# Patient Record
(demographics unavailable — no encounter records)

---

## 2024-12-10 NOTE — PAST MEDICAL HISTORY
[Postmenopausal] : The patient is postmenopausal [unknown] : the patient is unsure of the date of her LMP [Total Preg ___] : G[unfilled] [History of Hormone Replacement Treatment] : has no history of hormone replacement treatment [Living ___] : Living: [unfilled] [FreeTextEntry7] : no [FreeTextEntry8] : no

## 2024-12-10 NOTE — CONSULT LETTER
[Dear  ___] : Dear ~GENA, [Consult Letter:] : I had the pleasure of evaluating your patient, [unfilled]. [Please see my note below.] : Please see my note below. [Consult Closing:] : Thank you very much for allowing me to participate in the care of this patient.  If you have any questions, please do not hesitate to contact me. [FreeTextEntry3] : Kelly Mario MD

## 2024-12-10 NOTE — ASSESSMENT
[FreeTextEntry1] : Referred by Dr. Tran Perez PCP NATHANIEL Aguiar   Patient is a 79 year old female here today for consultation. RIGHT DCIS* Breast calcs were initially found on CT to the left breast but further imaging found them to be benign, however indeterminate calcs were found on the RIght on breast imaging.  Pt denies any breast lesions, discharge or masses.  10/14/2024 SB B[facility]U bilat dMMG and US: Scattered areas of FG density. LEFT- UO breast reveals 2 coarse, benign calcifications. No susp calcs, architectural distortion or mass is seen. RIGHT*-Of incidental note, at the 12:00 right mid breast is focal architectural distortion w/associated heterogenous grouped microcalcs, persistent on additional targeted mmg imaging. This incidental finding is susp for malignancy, with no definitive US correlate.   US: LEFT: targeted imaging of the UO breast reveals coarse calcs which correlates to mmg findings. At the UO breast, there is a superficial focus of generalized increased density within the associated 2 mm debris-filled cyst, likely of recent traumatic injury. Coarse calcs 1:00 2cm FN correlates to benign mmg findings. RIGHT*: targeted imaging of the upper right breast reveals multiple debris-filled cysts, 12:00 1cm FN. A vague focus of decreased attenuation w/focal shadowing at the 12:00 position, though sonographically nonspecific. No definitive mass or distortion is seen to correlate to mmg findings above. A minimally dilated duct is noted at the right breast 9:00 1cm FN, minimally dilated ducts or noted at the RA region. Morphologically normal-appearing axillary LN bilat. Imp- Benign calcs at the UO LEFT breast correlates to outside CT. Incidental note of focal architectural distortion w/associated grouped heterogeneous calcs at the 12:00 RIGH* mid breast on mmg, susp for malignancy, as this finding has no definitive sono correlate. Rec- stereotactic bx RIGHT. BR4  11/21/2024 [facility] stereotactic core bx Pathology: UO RIGHT- DCIS, intermediate gr, nuclear atypia. Microcals present.  Imp-Results are CONDORDANT. Of note, residual calcs are seen at the bx site measuring up to 2.3cm in widest AP dimension. Wide excision should be considered at the time of surgery. Also MRI-enhanced breast MRI should be obtained to r/o additional areas of disease.  Rec-Surgical or Oncologic management.  RECEPTORS PENDING**  Fhx: Breast-Niece ? age. 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   CBE: Bilat pendulous breasts, right 12:00 bx site, no suspicious masses or lesions. No axillary or SC lymphadenopathy.  Long discussion with patient regarding the biopsy results from [facility] from 11/21/2024 showing DCIS of the R breast at the 12:00 oclock, 2-3 cm FTN, Grade 2 ER/ID pending, measuring 2.3 cm per span of calcium.   Reviewed extensively the options of mastectomy vs lumpectomy with the pros and cons for both. With mastectomy, option of reconstruction (expander/implant vs autologous flap) discussed and handout on reconstruction and contact for plastic surgeon given.  Will probably need a drain with mastectomy.  Also discussed usually no radiation with mastectomy but pending final pathology results.  Discussed usually no reexcision with mastectomy.  With lumpectomy, may need radiation and treatment was reviewed.  Jerome need wire or localizer localization.  FAMILIA  is a candidate for oncoplastic surgery and discussed with her in detail but she declines. Also discussed will need negative margins and if too close or positive, may need additional surgery reexcision vs mastectomy. Will not need SLNBx with lumpectomy but if microinvasion or invasion found on final path, may need delayed SLNbx or axillary surgery. Receptors pending to determine hormonal therapy.  No chemotherapy for Stage 0, DCIS. Medicare does not cover for DCIS despite Niece with breast cancer in her 30's.  FAMILIA  given contact information for radiation oncology, Dr. Alvarado.  She is leaning towards lumpectomy, reviewed 12:00 location, cosmesis may not be optimal.  Pt requests I speak to her Helen kim [phone].  PLAN: MRI breast Appt with Dr. Alvarado To determine if she would like to pay out of pocket for the genetic testing since no coverage. Pt leaning towards lumpectomy only. Bracketed wire given 2.3 cm span? Will determine after MRI.

## 2024-12-10 NOTE — PHYSICAL EXAM
[Normocephalic] : normocephalic [Atraumatic] : atraumatic [Supple] : supple [No Supraclavicular Adenopathy] : no supraclavicular adenopathy [Examined in the supine and seated position] : examined in the supine and seated position [Asymmetrical] : asymmetrical [Bra Size: ___] : Bra Size: [unfilled] [Grade 3] : Ptosis Grade 3 [No dominant masses] : no dominant masses in right breast  [No dominant masses] : no dominant masses left breast [No Nipple Retraction] : no left nipple retraction [No Nipple Discharge] : no left nipple discharge [No Axillary Lymphadenopathy] : no left axillary lymphadenopathy [No Edema] : no edema [No Rashes] : no rashes [No Ulceration] : no ulceration [de-identified] : Small 12:00 biopsy site noted. NO suspicious masses or lesions

## 2024-12-10 NOTE — HISTORY OF PRESENT ILLNESS
[FreeTextEntry1] : Referred by Dr. Tran Perez PCP NATHANIEL Aguiar   Patient is a 79 year old female here today for consultation. RIGHT DCIS* Breast calcs were initially found on CT to the left breast but further imaging found them to be benign, however indeterminate calcs were found on the RIght on breast imaging.  Pt denies any breast lesions, discharge or masses.  10/14/2024 SB B[facility]U bilat dMMG and US: Scattered areas of FG density. LEFT- UO breast reveals 2 coarse, benign calcifications. No susp calcs, architectural distortion or mass is seen. RIGHT*-Of incidental note, at the 12:00 right mid breast is focal architectural distortion w/associated heterogenous grouped microcalcs, persistent on additional targeted mmg imaging. This incidental finding is susp for malignancy, with no definitive US correlate.   US: LEFT: targeted imaging of the UO breast reveals coarse calcs which correlates to mmg findings. At the UO breast, there is a superficial focus of generalized increased density within the associated 2 mm debris-filled cyst, likely of recent traumatic injury. Coarse calcs 1:00 2cm FN correlates to benign mmg findings. RIGHT*: targeted imaging of the upper right breast reveals multiple debris-filled cysts, 12:00 1cm FN. A vague focus of decreased attenuation w/focal shadowing at the 12:00 position, though sonographically nonspecific. No definitive mass or distortion is seen to correlate to mmg findings above. A minimally dilated duct is noted at the right breast 9:00 1cm FN, minimally dilated ducts or noted at the RA region. Morphologically normal-appearing axillary LN bilat. Imp- Benign calcs at the UO LEFT breast correlates to outside CT. Incidental note of focal architectural distortion w/associated grouped heterogeneous calcs at the 12:00 RIGH* mid breast on mmg, susp for malignancy, as this finding has no definitive sono correlate. Rec- stereotactic bx RIGHT. BR4  11/21/2024 [facility] stereotactic core bx Pathology: UO RIGHT- DCIS, intermediate gr, nuclear atypia. Microcals present.  Imp-Results are CONDORDANT. Of note, residual calcs are seen at the bx site measuring up to 2.3cm in widest AP dimension. Wide excision should be considered at the time of surgery. Also MRI-enhanced breast MRI should be obtained to r/o additional areas of disease.  Rec-Surgical or Oncologic management.  RECEPTORS PENDING**  Fhx: Breast-Niece ? age. 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

## 2024-12-10 NOTE — DATA REVIEWED
[FreeTextEntry1] : 10/14/2024 SB BINU bilat dMMG and US: Scattered areas of FG density. LEFT- UO breast reveals 2 coarse, benign calcifications. No susp calcs, architectural distortion or mass is seen. RIGHT*-Of incidental note, at the 12:00 right mid breast is focal architectural distortion w/associated heterogenous grouped microcalcs, persistent on additional targeted mmg imaging. This incidental finding is susp for malignancy, with no definitive US correlate.   US: LEFT: targeted imaging of the UO breast reveals coarse calcs which correlates to mmg findings. At the UO breast, there is a superficial focus of generalized increased density within the associated 2 mm debris-filled cyst, likely of recent traumatic injury. Coarse calcs 1:00 2cm FN correlates to benign mmg findings. RIGHT*: targeted imaging of the upper right breast reveals multiple debris-filled cysts, 12:00 1cm FN. A vague focus of decreased attenuation w/focal shadowing at the 12:00 position, though sonographically nonspecific. No definitive mass or distortion is seen to correlate to mmg findings above. A minimally dilated duct is noted at the right breast 9:00 1cm FN, minimally dilated ducts or noted at the RA region. Morphologically normal-appearing axillary LN bilat. Imp- Benign calcs at the UO LEFT breast correlates to outside CT. Incidental note of focal architectural distortion w/associated grouped heterogeneous calcs at the 12:00 RIGH* mid breast on mmg, susp for malignancy, as this finding has no definitive sono correlate. Rec- stereotactic bx RIGHT. BR4  11/21/2024 [facility] RIGHT stereotactic core bx Pathology: UO RIGHT- DCIS, intermediate gr, nuclear atypia. Microcals present.  Imp-Results are CONDORDANT. Of note, residual calcs are seen at the bx site measuring up to 2.3cm in widest AP dimension. Wide excision should be considered at the time of surgery. Also MRI-enhanced breast MRI should be obtained to r/o additional areas of disease.  Rec-Surgical or Oncologic management.

## 2024-12-10 NOTE — PHYSICAL EXAM
[Normocephalic] : normocephalic [Atraumatic] : atraumatic [Supple] : supple [No Supraclavicular Adenopathy] : no supraclavicular adenopathy [Examined in the supine and seated position] : examined in the supine and seated position [Asymmetrical] : asymmetrical [Bra Size: ___] : Bra Size: [unfilled] [Grade 3] : Ptosis Grade 3 [No dominant masses] : no dominant masses in right breast  [No dominant masses] : no dominant masses left breast [No Nipple Retraction] : no left nipple retraction [No Nipple Discharge] : no left nipple discharge [No Axillary Lymphadenopathy] : no left axillary lymphadenopathy [No Edema] : no edema [No Rashes] : no rashes [No Ulceration] : no ulceration [de-identified] : Small 12:00 biopsy site noted. NO suspicious masses or lesions

## 2024-12-10 NOTE — HISTORY OF PRESENT ILLNESS
[FreeTextEntry1] : Referred by Dr. Tran Perez PCP NATHANIEL Aguiar   Patient is a 79 year old female here today for consultation. RIGHT DCIS* Breast calcs were initially found on CT to the left breast but further imaging found them to be benign, however indeterminate calcs were found on the RIght on breast imaging.  Pt denies any breast lesions, discharge or masses.  10/14/2024 SB BINU bilat dMMG and US: Scattered areas of FG density. LEFT- UO breast reveals 2 coarse, benign calcifications. No susp calcs, architectural distortion or mass is seen. RIGHT*-Of incidental note, at the 12:00 right mid breast is focal architectural distortion w/associated heterogenous grouped microcalcs, persistent on additional targeted mmg imaging. This incidental finding is susp for malignancy, with no definitive US correlate.   US: LEFT: targeted imaging of the UO breast reveals coarse calcs which correlates to mmg findings. At the UO breast, there is a superficial focus of generalized increased density within the associated 2 mm debris-filled cyst, likely of recent traumatic injury. Coarse calcs 1:00 2cm FN correlates to benign mmg findings. RIGHT*: targeted imaging of the upper right breast reveals multiple debris-filled cysts, 12:00 1cm FN. A vague focus of decreased attenuation w/focal shadowing at the 12:00 position, though sonographically nonspecific. No definitive mass or distortion is seen to correlate to mmg findings above. A minimally dilated duct is noted at the right breast 9:00 1cm FN, minimally dilated ducts or noted at the RA region. Morphologically normal-appearing axillary LN bilat. Imp- Benign calcs at the UO LEFT breast correlates to outside CT. Incidental note of focal architectural distortion w/associated grouped heterogeneous calcs at the 12:00 RIGH* mid breast on mmg, susp for malignancy, as this finding has no definitive sono correlate. Rec- stereotactic bx RIGHT. BR4  11/21/2024 [facility] stereotactic core bx Pathology: UO RIGHT- DCIS, intermediate gr, nuclear atypia. Microcals present.  Imp-Results are CONDORDANT. Of note, residual calcs are seen at the bx site measuring up to 2.3cm in widest AP dimension. Wide excision should be considered at the time of surgery. Also MRI-enhanced breast MRI should be obtained to r/o additional areas of disease.  Rec-Surgical or Oncologic management.  RECEPTORS PENDING**  Fhx: Breast-Niece ? age. 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

## 2024-12-10 NOTE — ASSESSMENT
[FreeTextEntry1] : Referred by Dr. Tran Perez PCP NATHANIEL Aguiar   Patient is a 79 year old female here today for consultation. RIGHT DCIS* Breast calcs were initially found on CT to the left breast but further imaging found them to be benign, however indeterminate calcs were found on the RIght on breast imaging.  Pt denies any breast lesions, discharge or masses.  10/14/2024 SB BINU bilat dMMG and US: Scattered areas of FG density. LEFT- UO breast reveals 2 coarse, benign calcifications. No susp calcs, architectural distortion or mass is seen. RIGHT*-Of incidental note, at the 12:00 right mid breast is focal architectural distortion w/associated heterogenous grouped microcalcs, persistent on additional targeted mmg imaging. This incidental finding is susp for malignancy, with no definitive US correlate.   US: LEFT: targeted imaging of the UO breast reveals coarse calcs which correlates to mmg findings. At the UO breast, there is a superficial focus of generalized increased density within the associated 2 mm debris-filled cyst, likely of recent traumatic injury. Coarse calcs 1:00 2cm FN correlates to benign mmg findings. RIGHT*: targeted imaging of the upper right breast reveals multiple debris-filled cysts, 12:00 1cm FN. A vague focus of decreased attenuation w/focal shadowing at the 12:00 position, though sonographically nonspecific. No definitive mass or distortion is seen to correlate to mmg findings above. A minimally dilated duct is noted at the right breast 9:00 1cm FN, minimally dilated ducts or noted at the RA region. Morphologically normal-appearing axillary LN bilat. Imp- Benign calcs at the UO LEFT breast correlates to outside CT. Incidental note of focal architectural distortion w/associated grouped heterogeneous calcs at the 12:00 RIGH* mid breast on mmg, susp for malignancy, as this finding has no definitive sono correlate. Rec- stereotactic bx RIGHT. BR4  11/21/2024 [facility] stereotactic core bx Pathology: UO RIGHT- DCIS, intermediate gr, nuclear atypia. Microcals present.  Imp-Results are CONDORDANT. Of note, residual calcs are seen at the bx site measuring up to 2.3cm in widest AP dimension. Wide excision should be considered at the time of surgery. Also MRI-enhanced breast MRI should be obtained to r/o additional areas of disease.  Rec-Surgical or Oncologic management.  RECEPTORS PENDING**  Fhx: Breast-Niece ? age. 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   CBE: Bilat pendulous breasts, right 12:00 bx site, no suspicious masses or lesions. No axillary or SC lymphadenopathy.  Long discussion with patient regarding the biopsy results from [facility] from 11/21/2024 showing DCIS of the R breast at the 12:00 oclock, 2-3 cm FTN, Grade 2 ER/CO pending, measuring 2.3 cm per span of calcium.   Reviewed extensively the options of mastectomy vs lumpectomy with the pros and cons for both. With mastectomy, option of reconstruction (expander/implant vs autologous flap) discussed and handout on reconstruction and contact for plastic surgeon given.  Will probably need a drain with mastectomy.  Also discussed usually no radiation with mastectomy but pending final pathology results.  Discussed usually no reexcision with mastectomy.  With lumpectomy, may need radiation and treatment was reviewed.  Jerome need wire or localizer localization.  FAMILIA  is a candidate for oncoplastic surgery and discussed with her in detail but she declines. Also discussed will need negative margins and if too close or positive, may need additional surgery reexcision vs mastectomy. Will not need SLNBx with lumpectomy but if microinvasion or invasion found on final path, may need delayed SLNbx or axillary surgery. Receptors pending to determine hormonal therapy.  No chemotherapy for Stage 0, DCIS. Medicare does not cover for DCIS despite Niece with breast cancer in her 30's.  FAMILIA  given contact information for radiation oncology, Dr. Alvarado.  She is leaning towards lumpectomy, reviewed 12:00 location, cosmesis may not be optimal.  Pt requests I speak to her Helen kim [phone].  PLAN: MRI breast Appt with Dr. Alvarado To determine if she would like to pay out of pocket for the genetic testing since no coverage. Pt leaning towards lumpectomy only. Bracketed wire given 2.3 cm span? Will determine after MRI.

## 2024-12-10 NOTE — DATA REVIEWED
[FreeTextEntry1] : 10/14/2024 SB BINU bilat dMMG and US: Scattered areas of FG density. LEFT- UO breast reveals 2 coarse, benign calcifications. No susp calcs, architectural distortion or mass is seen. RIGHT*-Of incidental note, at the 12:00 right mid breast is focal architectural distortion w/associated heterogenous grouped microcalcs, persistent on additional targeted mmg imaging. This incidental finding is susp for malignancy, with no definitive US correlate.   US: LEFT: targeted imaging of the UO breast reveals coarse calcs which correlates to mmg findings. At the UO breast, there is a superficial focus of generalized increased density within the associated 2 mm debris-filled cyst, likely of recent traumatic injury. Coarse calcs 1:00 2cm FN correlates to benign mmg findings. RIGHT*: targeted imaging of the upper right breast reveals multiple debris-filled cysts, 12:00 1cm FN. A vague focus of decreased attenuation w/focal shadowing at the 12:00 position, though sonographically nonspecific. No definitive mass or distortion is seen to correlate to mmg findings above. A minimally dilated duct is noted at the right breast 9:00 1cm FN, minimally dilated ducts or noted at the RA region. Morphologically normal-appearing axillary LN bilat. Imp- Benign calcs at the UO LEFT breast correlates to outside CT. Incidental note of focal architectural distortion w/associated grouped heterogeneous calcs at the 12:00 RIGH* mid breast on mmg, susp for malignancy, as this finding has no definitive sono correlate. Rec- stereotactic bx RIGHT. BR4  11/21/2024 [facility] RIGHT stereotactic core bx Pathology: UO RIGHT- DCIS, intermediate gr, nuclear atypia. Microcals present.  Imp-Results are CONDORDANT. Of note, residual calcs are seen at the bx site measuring up to 2.3cm in widest AP dimension. Wide excision should be considered at the time of surgery. Also MRI-enhanced breast MRI should be obtained to r/o additional areas of disease.  Rec-Surgical or Oncologic management.

## 2025-01-09 NOTE — DISCUSSION/SUMMARY
[Procedure Low Risk] : the procedure risk is low [Patient Low Risk] : the patient is a low surgical risk [Optimized for Surgery] : the patient is optimized for surgery [As per surgery] : as per surgery [Continue] : Continue medications as currently directed [FreeTextEntry1] : The patient is cleared to undergo breast surgery.

## 2025-01-09 NOTE — HISTORY OF PRESENT ILLNESS
[Preoperative Visit] : for a medical evaluation prior to surgery [Scheduled Procedure ___] : a [unfilled] [Date of Surgery ___] : on [unfilled] [Surgeon Name ___] : surgeon: [unfilled] [Good] : Good [Poor Exercise Tolerance] : poor exercise tolerance [Diabetes] : diabetes [Cardiovascular Disease] : cardiovascular disease [Pulmonary Disease] : pulmonary disease [Prior Anesthesia] : Prior anesthesia [Fever] : no fever [Chills] : no chills [Fatigue] : no fatigue [Chest Pain] : no chest pain [Cough] : no cough [Dyspnea] : no dyspnea [Dysuria] : no dysuria [Urinary Frequency] : no urinary frequency [Nausea] : no nausea [Vomiting] : no vomiting [Diarrhea] : no diarrhea [Abdominal Pain] : no abdominal pain [Easy Bruising] : no easy bruising [Lower Extremity Swelling] : no lower extremity swelling [Anti-Platelet Agents] : no anti-platelet agents [Nicotine Dependence] : no nicotine dependence [Alcohol Use] : no  alcohol use [Renal Disease] : no renal disease [GI Disease] : no gastrointestinal disease [Sleep Apnea] : no sleep apnea [Thromboembolic Problems] : no thromboembolic problems [Clotting Disorder] : no clotting disorder [Frequent use of NSAIDs] : no use of NSAIDs [Bleeding Disorder] : no bleeding disorder [Transfusion Reaction] : no transfusion reaction [Impaired Immunity] : no impaired immunity [Steroid Use in Last 6 Months] : no steroid use in the last six months [Frequent Aspirin Use] : no frequent aspirin use [Prev Anesthesia Reaction] : no previous anesthesia reaction [FreeTextEntry1] : She has no chest pain She has no shortness of breath She has no palpitations She has no syncope She is neurologically intact She has no edema She has no skin rashes

## 2025-01-10 NOTE — PAST MEDICAL HISTORY
[Postmenopausal] : The patient is postmenopausal [History of Hormone Replacement Treatment] : has no history of hormone replacement treatment [unknown] : the patient is unsure of the date of her LMP [Total Preg ___] : G[unfilled] [Living ___] : Living: [unfilled] [FreeTextEntry7] : no [FreeTextEntry8] : no

## 2025-01-10 NOTE — CONSULT LETTER
[Dear  ___] : Dear ~GENA, [Courtesy Letter:] : I had the pleasure of seeing your patient, [unfilled], in my office today. [Please see my note below.] : Please see my note below. [Sincerely,] : Sincerely, [FreeTextEntry3] : Kelly Mario MD [DrJesse  ___] : Dr. KNIGHT

## 2025-01-10 NOTE — HISTORY OF PRESENT ILLNESS
[FreeTextEntry1] : Referred by Dr. Tran Perez PCP NATHANIEL Aguiar  Patient is a 79-year-old female here today for follow up visit to discuss upcoming surgery this week 1/16/25 for R wire loc lumpectomy for DCIS Breast calcs were initially found on CT to the left breast but further imaging found them to be benign, however indeterminate calcs were found on the Right on breast imaging.  She saw Dr. Alvarado on 12/26/24 Dhiraj testing ? DCIS * Risk reduction with AI -Med/Onc post-op *   10/14/2024 SB BINU bilat dMMG and US: Scattered areas of FG density. LEFT- UO breast reveals 2 coarse, benign calcifications. No susp calcs, architectural distortion or mass is seen. RIGHT*-Of incidental note, at the 12:00 right mid breast is focal architectural distortion w/associated heterogenous grouped microcalcs, persistent on additional targeted mmg imaging. This incidental finding is susp for malignancy, with no definitive US correlate. US: LEFT: targeted imaging of the UO breast reveals coarse calcs which correlates to mmg findings. At the UO breast, there is a superficial focus of generalized increased density within the associated 2 mm debris-filled cyst, likely of recent traumatic injury. Coarse calcs 1:00 2cm FN correlates to benign mmg findings. RIGHT*: targeted imaging of the upper right breast reveals multiple debris-filled cysts, 12:00 1cm FN. A vague focus of decreased attenuation w/focal shadowing at the 12:00 position, though sonographically nonspecific. No definitive mass or distortion is seen to correlate to mmg findings above. A minimally dilated duct is noted at the right breast 9:00 1cm FN, minimally dilated ducts or noted at the RA region. Morphologically normal-appearing axillary LN bilat. Imp- Benign calcs at the UO LEFT breast correlates to outside CT. Incidental note of focal architectural distortion w/associated grouped heterogeneous calcs at the 12:00 RIGH* mid breast on mmg, susp for malignancy, as this finding has no definitive sono correlate. Rec- stereotactic bx RIGHT. BR4  11/21/2024 [facility] RIGHT stereotactic core bx Pathology: UO RIGHT- DCIS, intermediate gr, nuclear atypia. Microcals present. ER+ (95%)/ LA+ (100%).  Results are CONDORDANT. Of note, residual calcs are seen at the bx site measuring up to 2.3cm in widest AP dimension. Wide excision should be considered at the time of surgery.   12/27/2024 NFR breast MRI: The breast parenchyma is composed of scattered FG tissue. RIGHT BREAST: The bx marker in the location of the recently diagnosed malignancy is associated with non-mass enhancement measuring 2 cm, axial image 36. There are scattered enhancing nonspecific foci. There is no additional suspicious enhancement in the right breast. LEFT BREAST: There are scattered enhancing nonspecific foci. There is no suspicious enhancement in the left breast. AXILLA/OTHER: There is no significant axillary or internal mammary lymphadenopathy. IMPRESSION: Recently diagnosed right breast malignancy. No additional suspicious enhancement. REC: Surgical or oncologic management. BR6.  Fhx: Breast-Niece ? age. 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  CBE: Bilat pendulous breasts, right 12:00 bx site, no suspicious masses or lesions. No axillary or SC lymphadenopathy.  Long discussion with patient regarding the biopsy results from [facility] from 11/21/2024 showing DCIS of the R breast at the 12:00 oclock, 2-3 cm FTN, Grade 2 ER/LA pending, measuring 2.3 cm per span of calcium. Reviewed extensively the options of mastectomy vs lumpectomy with the pros and cons for both. With mastectomy, option of reconstruction (expander/implant vs autologous flap) discussed and handout on reconstruction and contact for plastic surgeon given. Will probably need a drain with mastectomy. Also discussed usually no radiation with mastectomy but pending final pathology results. Discussed usually no reexcision with mastectomy. With lumpectomy, may need radiation and treatment was reviewed. Jerome need wire or localizer localization. FAMILIA is a candidate for oncoplastic surgery and discussed with her in detail but she declines. Also discussed will need negative margins and if too close or positive, may need additional surgery reexcision vs mastectomy. Will not need SLNBx with lumpectomy but if microinvasion or invasion found on final path, may need delayed SLNbx or axillary surgery. Receptors pending to determine hormonal therapy. No chemotherapy for Stage 0, DCIS. Medicare does not cover for DCIS despite Niece with breast cancer in her 30's.  FAMILIA given contact information for radiation oncology, Dr. Aral. She is leaning towards lumpectomy, reviewed 12:00 location, cosmesis may not be optimal. Pt requests I speak to her daugher, Helen [phone].  PLAN: MRI breast Appt with Dr. Alvarado To determine if she would like to pay out of pocket for the genetic testing since no coverage. Pt leaning towards lumpectomy only. Bracketed wire given 2.3 cm span? Will determine after MRI.

## 2025-01-14 NOTE — CONSULT LETTER
[Dear  ___] : Dear ~GENA, [Courtesy Letter:] : I had the pleasure of seeing your patient, [unfilled], in my office today. [Please see my note below.] : Please see my note below. [Sincerely,] : Sincerely, [DrJesse  ___] : Dr. KNIGHT [FreeTextEntry3] : Kelly Mario MD

## 2025-01-14 NOTE — HISTORY OF PRESENT ILLNESS
[FreeTextEntry1] : Referred by Dr. Tran Perez PCP NATHANIEL Aguiar  Patient is a 79-year-old female here today for follow up visit to discuss upcoming surgery this week 1/16/25 for R wire loc lumpectomy for DCIS Breast calcs were initially found on CT to the left breast but further imaging found them to be benign, however indeterminate calcs were found on the Right on breast imaging.  She saw Dr. Alvarado on 12/26/24.  Pt still considering genetic testing, not covered by insurance given DCIS.  Risk reduction with AI -Med/Onc post-op.  10/14/2024 SB BINU bilat dMMG and US: Scattered areas of FG density. LEFT- UO breast reveals 2 coarse, benign calcifications. No susp calcs, architectural distortion or mass is seen. RIGHT*-Of incidental note, at the 12:00 right mid breast is focal architectural distortion w/associated heterogenous grouped microcalcs, persistent on additional targeted mmg imaging. This incidental finding is susp for malignancy, with no definitive US correlate. US: LEFT: targeted imaging of the UO breast reveals coarse calcs which correlates to mmg findings. At the UO breast, there is a superficial focus of generalized increased density within the associated 2 mm debris-filled cyst, likely of recent traumatic injury. Coarse calcs 1:00 2cm FN correlates to benign mmg findings. RIGHT*: targeted imaging of the upper right breast reveals multiple debris-filled cysts, 12:00 1cm FN. A vague focus of decreased attenuation w/focal shadowing at the 12:00 position, though sonographically nonspecific. No definitive mass or distortion is seen to correlate to mmg findings above. A minimally dilated duct is noted at the right breast 9:00 1cm FN, minimally dilated ducts or noted at the RA region. Morphologically normal-appearing axillary LN bilat. Imp- Benign calcs at the UO LEFT breast correlates to outside CT. Incidental note of focal architectural distortion w/associated grouped heterogeneous calcs at the 12:00 RIGH* mid breast on mmg, susp for malignancy, as this finding has no definitive sono correlate. Rec- stereotactic bx RIGHT. BR4  11/21/2024 [facility] stereotactic core bx Pathology: UO RIGHT- DCIS, intermediate gr, nuclear atypia. Microcals present. ER+ (95%)/ DE+ (100%). Results are CONDORDANT. Of note, residual calcs are seen at the bx site measuring up to 2.3cm in widest AP dimension. Wide excision should be considered at the time of surgery.  12/27/2024 NFR breast MRI: The breast parenchyma is composed of scattered FG tissue. RIGHT BREAST: The bx marker in the location of the recently diagnosed malignancy is associated with non-mass enhancement measuring 2 cm, axial image 36. There are scattered enhancing nonspecific foci. There is no additional suspicious enhancement in the right breast. LEFT BREAST: There are scattered enhancing nonspecific foci. There is no suspicious enhancement in the left breast. AXILLA/OTHER: There is no significant axillary or internal mammary lymphadenopathy. IMPRESSION: Recently diagnosed right breast malignancy. No additional suspicious enhancement. REC: Surgical or oncologic management. BR6.  Fhx: Breast-Niece ? age. 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  CBE: Bilat pendulous breasts, right 12:00 bx site, no suspicious masses or lesions. No axillary or SC lymphadenopathy.  Long discussion with patient regarding the biopsy results from [facility] from 11/21/2024 showing DCIS of the R breast at the 12:00 oclock, 2-3 cm FTN, Grade 2 ER/DE pending, measuring 2.3 cm per span of calcium. Reviewed extensively the options of mastectomy vs lumpectomy with the pros and cons for both. With mastectomy, option of reconstruction (expander/implant vs autologous flap) discussed and handout on reconstruction and contact for plastic surgeon given. Will probably need a drain with mastectomy. Also discussed usually no radiation with mastectomy but pending final pathology results. Discussed usually no reexcision with mastectomy. With lumpectomy, may need radiation and treatment was reviewed. Jerome need wire or localizer localization. FAMILIA is a candidate for oncoplastic surgery and discussed with her in detail but she declines. Also discussed will need negative margins and if too close or positive, may need additional surgery reexcision vs mastectomy. Will not need SLNBx with lumpectomy but if microinvasion or invasion found on final path, may need delayed SLNbx or axillary surgery. Receptors pending to determine hormonal therapy. No chemotherapy for Stage 0, DCIS. Medicare does not cover for DCIS despite Niece with breast cancer in her 30's.  FAMILIA given contact information for radiation oncology, Dr. Alvarado. She is leaning towards lumpectomy, reviewed 12:00 location, cosmesis may not be optimal. Pt requests I speak to her rahulugherHelen [phone].  PLAN: MRI breast Appt with Dr. Alvarado To determine if she would like to pay out of pocket for the genetic testing since no coverage. Pt leaning towards lumpectomy only. Bracketed wire given 2.3 cm span? Will determine after MRI.

## 2025-01-14 NOTE — ASSESSMENT
[FreeTextEntry1] : Referred by Dr. Tran Perez PCP ANTHANIEL Aguiar  Patient is a 79-year-old female here today for follow up visit to discuss upcoming surgery this week 1/16/25 for R wire loc lumpectomy for DCIS Breast calcs were initially found on CT to the left breast but further imaging found them to be benign, however indeterminate calcs were found on the Right on breast imaging.  She saw Dr. Alvarado on 12/26/24.  Pt still considering genetic testing, not covered by insurance given DCIS.  Risk reduction with AI -Med/Onc post-op.  10/14/2024 SB BINU bilat dMMG and US: Scattered areas of FG density. LEFT- UO breast reveals 2 coarse, benign calcifications. No susp calcs, architectural distortion or mass is seen. RIGHT*-Of incidental note, at the 12:00 right mid breast is focal architectural distortion w/associated heterogenous grouped microcalcs, persistent on additional targeted mmg imaging. This incidental finding is susp for malignancy, with no definitive US correlate. US: LEFT: targeted imaging of the UO breast reveals coarse calcs which correlates to mmg findings. At the UO breast, there is a superficial focus of generalized increased density within the associated 2 mm debris-filled cyst, likely of recent traumatic injury. Coarse calcs 1:00 2cm FN correlates to benign mmg findings. RIGHT*: targeted imaging of the upper right breast reveals multiple debris-filled cysts, 12:00 1cm FN. A vague focus of decreased attenuation w/focal shadowing at the 12:00 position, though sonographically nonspecific. No definitive mass or distortion is seen to correlate to mmg findings above. A minimally dilated duct is noted at the right breast 9:00 1cm FN, minimally dilated ducts or noted at the RA region. Morphologically normal-appearing axillary LN bilat. Imp- Benign calcs at the UO LEFT breast correlates to outside CT. Incidental note of focal architectural distortion w/associated grouped heterogeneous calcs at the 12:00 RIGH* mid breast on mmg, susp for malignancy, as this finding has no definitive sono correlate. Rec- stereotactic bx RIGHT. BR4  11/21/2024 [facility] stereotactic core bx Pathology: UO RIGHT- DCIS, intermediate gr, nuclear atypia. Microcals present. ER+ (95%)/ [place]+ (100%). Results are CONDORDANT. Of note, residual calcs are seen at the bx site measuring up to 2.3cm in widest AP dimension. Wide excision should be considered at the time of surgery.  12/27/2024 NFR breast MRI: The breast parenchyma is composed of scattered FG tissue. RIGHT BREAST: The bx marker in the location of the recently diagnosed malignancy is associated with non-mass enhancement measuring 2 cm, axial image 36. There are scattered enhancing nonspecific foci. There is no additional suspicious enhancement in the right breast. LEFT BREAST: There are scattered enhancing nonspecific foci. There is no suspicious enhancement in the left breast. AXILLA/OTHER: There is no significant axillary or internal mammary lymphadenopathy. IMPRESSION: Recently diagnosed right breast malignancy. No additional suspicious enhancement. REC: Surgical or oncologic management. BR6.  Fhx: Breast-Niece ? age. 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  CBE: Bilat pendulous breasts, right 12:00 bx site, no suspicious masses or lesions. No axillary or SC lymphadenopathy.  Reviewed with pt, procedure for this thursday, RIght breast DCIS of the R breast at the 12:00 oclock, 2-3 cm FTN, Grade 2 ER/[place] 95/100%, measuring 2.3 cm per span of calcium. With lumpectomy, may need radiation and treatment was reviewed, pt met with Dr. Alvarado. Pt to get wire localization.  She did not want oncoplastic. Again [id number] location, cosmesis may not be optimal kermit since wide lumpectomy recommended given span. Also discussed will need negative margins and if too close or positive, may need additional surgery reexcision vs mastectomy. Will not need SLNBx with lumpectomy given DCIS.  Will be recommended for hormonal therapy. No chemotherapy for Stage 0, DCIS. Medicare does not cover for DCIS despite Niece with breast cancer in her 30's. Pt requests I speak to her Helen kim [phone].  PLAN: Pt anxious about diagnosis but does not want to take any meds. For right breast wire localized lumpectomy 1/16/25 F.u 1 week after surgery Pt to think about genetic testing since not covered by medicare.

## 2025-01-14 NOTE — DATA REVIEWED
[FreeTextEntry1] : 12/27/2024 NFR breast MRI: The breast parenchyma is composed of scattered FG tissue. RIGHT BREAST: The bx marker in the location of the recently diagnosed malignancy is associated with non-mass enhancement measuring 2 cm, axial image 36. There are scattered enhancing nonspecific foci. There is no additional suspicious enhancement in the right breast. LEFT BREAST: There are scattered enhancing nonspecific foci. There is no suspicious enhancement in the left breast. AXILLA/OTHER: There is no significant axillary or internal mammary lymphadenopathy. IMPRESSION: Recently diagnosed right breast malignancy. No additional suspicious enhancement. REC: Surgical or oncologic management. BR6.

## 2025-01-14 NOTE — HISTORY OF PRESENT ILLNESS
[FreeTextEntry1] : Referred by Dr. Tran Perez PCP NATHANIEL Aguiar  Patient is a 79-year-old female here today for follow up visit to discuss upcoming surgery this week 1/16/25 for R wire loc lumpectomy for DCIS Breast calcs were initially found on CT to the left breast but further imaging found them to be benign, however indeterminate calcs were found on the Right on breast imaging.  She saw Dr. Alvarado on 12/26/24.  Pt still considering genetic testing, not covered by insurance given DCIS.  Risk reduction with AI -Med/Onc post-op.  10/14/2024 SB BINU bilat dMMG and US: Scattered areas of FG density. LEFT- UO breast reveals 2 coarse, benign calcifications. No susp calcs, architectural distortion or mass is seen. RIGHT*-Of incidental note, at the 12:00 right mid breast is focal architectural distortion w/associated heterogenous grouped microcalcs, persistent on additional targeted mmg imaging. This incidental finding is susp for malignancy, with no definitive US correlate. US: LEFT: targeted imaging of the UO breast reveals coarse calcs which correlates to mmg findings. At the UO breast, there is a superficial focus of generalized increased density within the associated 2 mm debris-filled cyst, likely of recent traumatic injury. Coarse calcs 1:00 2cm FN correlates to benign mmg findings. RIGHT*: targeted imaging of the upper right breast reveals multiple debris-filled cysts, 12:00 1cm FN. A vague focus of decreased attenuation w/focal shadowing at the 12:00 position, though sonographically nonspecific. No definitive mass or distortion is seen to correlate to mmg findings above. A minimally dilated duct is noted at the right breast 9:00 1cm FN, minimally dilated ducts or noted at the RA region. Morphologically normal-appearing axillary LN bilat. Imp- Benign calcs at the UO LEFT breast correlates to outside CT. Incidental note of focal architectural distortion w/associated grouped heterogeneous calcs at the 12:00 RIGH* mid breast on mmg, susp for malignancy, as this finding has no definitive sono correlate. Rec- stereotactic bx RIGHT. BR4  11/21/2024 [facility] stereotactic core bx Pathology: UO RIGHT- DCIS, intermediate gr, nuclear atypia. Microcals present. ER+ (95%)/ SD+ (100%). Results are CONDORDANT. Of note, residual calcs are seen at the bx site measuring up to 2.3cm in widest AP dimension. Wide excision should be considered at the time of surgery.  12/27/2024 NFR breast MRI: The breast parenchyma is composed of scattered FG tissue. RIGHT BREAST: The bx marker in the location of the recently diagnosed malignancy is associated with non-mass enhancement measuring 2 cm, axial image 36. There are scattered enhancing nonspecific foci. There is no additional suspicious enhancement in the right breast. LEFT BREAST: There are scattered enhancing nonspecific foci. There is no suspicious enhancement in the left breast. AXILLA/OTHER: There is no significant axillary or internal mammary lymphadenopathy. IMPRESSION: Recently diagnosed right breast malignancy. No additional suspicious enhancement. REC: Surgical or oncologic management. BR6.  Fhx: Breast-Niece ? age. 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  CBE: Bilat pendulous breasts, right 12:00 bx site, no suspicious masses or lesions. No axillary or SC lymphadenopathy.  Long discussion with patient regarding the biopsy results from [facility] from 11/21/2024 showing DCIS of the R breast at the 12:00 oclock, 2-3 cm FTN, Grade 2 ER/SD pending, measuring 2.3 cm per span of calcium. Reviewed extensively the options of mastectomy vs lumpectomy with the pros and cons for both. With mastectomy, option of reconstruction (expander/implant vs autologous flap) discussed and handout on reconstruction and contact for plastic surgeon given. Will probably need a drain with mastectomy. Also discussed usually no radiation with mastectomy but pending final pathology results. Discussed usually no reexcision with mastectomy. With lumpectomy, may need radiation and treatment was reviewed. Jerome need wire or localizer localization. FAMILIA is a candidate for oncoplastic surgery and discussed with her in detail but she declines. Also discussed will need negative margins and if too close or positive, may need additional surgery reexcision vs mastectomy. Will not need SLNBx with lumpectomy but if microinvasion or invasion found on final path, may need delayed SLNbx or axillary surgery. Receptors pending to determine hormonal therapy. No chemotherapy for Stage 0, DCIS. Medicare does not cover for DCIS despite Niece with breast cancer in her 30's.  FAMILIA given contact information for radiation oncology, Dr. Alvarado. She is leaning towards lumpectomy, reviewed 12:00 location, cosmesis may not be optimal. Pt requests I speak to her rahulugherHelen [phone].  PLAN: MRI breast Appt with Dr. Alvarado To determine if she would like to pay out of pocket for the genetic testing since no coverage. Pt leaning towards lumpectomy only. Bracketed wire given 2.3 cm span? Will determine after MRI.

## 2025-01-14 NOTE — PHYSICAL EXAM
[de-identified] :  Bilat pendulous breasts, right 12:00 bx site, no suspicious masses or lesions. No axillary or SC lymphadenopathy.
[de-identified] :  Bilat pendulous breasts, right 12:00 bx site, no suspicious masses or lesions. No axillary or SC lymphadenopathy.
Statement Selected

## 2025-01-14 NOTE — PAST MEDICAL HISTORY
[Postmenopausal] : The patient is postmenopausal [unknown] : the patient is unsure of the date of her LMP [Total Preg ___] : G[unfilled] [Living ___] : Living: [unfilled] [History of Hormone Replacement Treatment] : has no history of hormone replacement treatment [FreeTextEntry7] : no [FreeTextEntry8] : no

## 2025-01-14 NOTE — ASSESSMENT
[FreeTextEntry1] : Referred by Dr. Tran Perez PCP NATHANIEL Aguiar  Patient is a 79-year-old female here today for follow up visit to discuss upcoming surgery this week 1/16/25 for R wire loc lumpectomy for DCIS Breast calcs were initially found on CT to the left breast but further imaging found them to be benign, however indeterminate calcs were found on the Right on breast imaging.  She saw Dr. Alvarado on 12/26/24.  Pt still considering genetic testing, not covered by insurance given DCIS.  Risk reduction with AI -Med/Onc post-op.  10/14/2024 SB BINU bilat dMMG and US: Scattered areas of FG density. LEFT- UO breast reveals 2 coarse, benign calcifications. No susp calcs, architectural distortion or mass is seen. RIGHT*-Of incidental note, at the 12:00 right mid breast is focal architectural distortion w/associated heterogenous grouped microcalcs, persistent on additional targeted mmg imaging. This incidental finding is susp for malignancy, with no definitive US correlate. US: LEFT: targeted imaging of the UO breast reveals coarse calcs which correlates to mmg findings. At the UO breast, there is a superficial focus of generalized increased density within the associated 2 mm debris-filled cyst, likely of recent traumatic injury. Coarse calcs 1:00 2cm FN correlates to benign mmg findings. RIGHT*: targeted imaging of the upper right breast reveals multiple debris-filled cysts, 12:00 1cm FN. A vague focus of decreased attenuation w/focal shadowing at the 12:00 position, though sonographically nonspecific. No definitive mass or distortion is seen to correlate to mmg findings above. A minimally dilated duct is noted at the right breast 9:00 1cm FN, minimally dilated ducts or noted at the RA region. Morphologically normal-appearing axillary LN bilat. Imp- Benign calcs at the UO LEFT breast correlates to outside CT. Incidental note of focal architectural distortion w/associated grouped heterogeneous calcs at the 12:00 RIGH* mid breast on mmg, susp for malignancy, as this finding has no definitive sono correlate. Rec- stereotactic bx RIGHT. BR4  11/21/2024 [facility] stereotactic core bx Pathology: UO RIGHT- DCIS, intermediate gr, nuclear atypia. Microcals present. ER+ (95%)/ UT+ (100%). Results are CONDORDANT. Of note, residual calcs are seen at the bx site measuring up to 2.3cm in widest AP dimension. Wide excision should be considered at the time of surgery.  12/27/2024 NFR breast MRI: The breast parenchyma is composed of scattered FG tissue. RIGHT BREAST: The bx marker in the location of the recently diagnosed malignancy is associated with non-mass enhancement measuring 2 cm, axial image 36. There are scattered enhancing nonspecific foci. There is no additional suspicious enhancement in the right breast. LEFT BREAST: There are scattered enhancing nonspecific foci. There is no suspicious enhancement in the left breast. AXILLA/OTHER: There is no significant axillary or internal mammary lymphadenopathy. IMPRESSION: Recently diagnosed right breast malignancy. No additional suspicious enhancement. REC: Surgical or oncologic management. BR6.  Fhx: Breast-Niece ? age. 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  CBE: Bilat pendulous breasts, right 12:00 bx site, no suspicious masses or lesions. No axillary or SC lymphadenopathy.  Reviewed with pt, procedure for this thursday, RIght breast DCIS of the R breast at the 12:00 oclock, 2-3 cm FTN, Grade 2 ER/UT 95/100%, measuring 2.3 cm per span of calcium. With lumpectomy, may need radiation and treatment was reviewed, pt met with Dr. Alvarado. Pt to get wire localization.  She did not want oncoplastic. Again [id number] location, cosmesis may not be optimal kermit since wide lumpectomy recommended given span. Also discussed will need negative margins and if too close or positive, may need additional surgery reexcision vs mastectomy. Will not need SLNBx with lumpectomy given DCIS.  Will be recommended for hormonal therapy. No chemotherapy for Stage 0, DCIS. Medicare does not cover for DCIS despite Niece with breast cancer in her 30's. Pt requests I speak to her Helen kim [phone].  PLAN: Pt anxious about diagnosis but does not want to take any meds. For right breast wire localized lumpectomy 1/16/25 F.u 1 week after surgery Pt to think about genetic testing since not covered by medicare.

## 2025-01-24 NOTE — HISTORY OF PRESENT ILLNESS
[FreeTextEntry1] : Referred by Dr. Tran Perez PCP NATHANIEL Aguiar  Patient is a 79-year-old female here today for post op visit s/p 1/16/25 R wire loc lumpectomy for DCIS. 1/16/25 surgical path: Right breast, lumpectomy: DCIS, Gr 2, microcalcs present in DCIS and in nonneoplastic tissue. Distance from DCIS to Closest Margin:  Less than 1 mm. Closest Margin(s) to DCIS:  Anterior. Distance from DCIS to Lateral Margin: 1.5 mm 2. Right breast, additional posterior margin, excision: Benign breast tissue with FCC. 3. Right breast, additional medial margin, excision: DCIS, morphologically similar to that seen in part 1. DCIS measures 1.3mm in greatest diameter and is >2 mm from all inked margins including the new medial margin. 4. Right breast, additional inferior margin, excision: Benign breast tissue with FCC.  Breast calcs were initially found on CT to the left breast but further imaging found them to be benign, however indeterminate calcs were found on the Right on breast imaging.  Pt here with her daughter Helen. States she has been having some discomfort and soreness to the R breast, has noticed some redness and mild swelling also. Denies any fever/chills.  10/14/2024 SB BINU bilat dMMG and US: Scattered areas of FG density. LEFT- UO breast reveals 2 coarse, benign calcifications. No susp calcs, architectural distortion or mass is seen. RIGHT*-Of incidental note, at the 12:00 right mid breast is focal architectural distortion w/associated heterogenous grouped microcalcs, persistent on additional targeted mmg imaging. This incidental finding is susp for malignancy, with no definitive US correlate. US: LEFT: targeted imaging of the UO breast reveals coarse calcs which correlates to mmg findings. At the UO breast, there is a superficial focus of generalized increased density within the associated 2 mm debris-filled cyst, likely of recent traumatic injury. Coarse calcs 1:00 2cm FN correlates to benign mmg findings. RIGHT*: targeted imaging of the upper right breast reveals multiple debris-filled cysts, 12:00 1cm FN. A vague focus of decreased attenuation w/focal shadowing at the 12:00 position, though sonographically nonspecific. No definitive mass or distortion is seen to correlate to mmg findings above. A minimally dilated duct is noted at the right breast 9:00 1cm FN, minimally dilated ducts or noted at the RA region. Morphologically normal-appearing axillary LN bilat. Imp- Benign calcs at the UO LEFT breast correlates to outside CT. Incidental note of focal architectural distortion w/associated grouped heterogeneous calcs at the 12:00 RIGH* mid breast on mmg, susp for malignancy, as this finding has no definitive sono correlate. Rec- stereotactic bx RIGHT. BR4  11/21/2024 [facility] RIGHT stereotactic core bx Pathology: UO RIGHT- DCIS, intermediate gr, nuclear atypia. Microcals present. ER+ (95%)/ KS+ (100%). Results are CONDORDANT. Of note, residual calcs are seen at the bx site measuring up to 2.3cm in widest AP dimension. Wide excision should be considered at the time of surgery.  12/27/2024 NFR breast MRI: The breast parenchyma is composed of scattered FG tissue. RIGHT BREAST: The bx marker in the location of the recently diagnosed malignancy is associated with non-mass enhancement measuring 2 cm, axial image 36. There are scattered enhancing nonspecific foci. There is no additional suspicious enhancement in the right breast. LEFT BREAST: There are scattered enhancing nonspecific foci. There is no suspicious enhancement in the left breast. AXILLA/OTHER: There is no significant axillary or internal mammary lymphadenopathy. IMPRESSION: Recently diagnosed right breast malignancy. No additional suspicious enhancement. REC: Surgical or oncologic management. BR6.  1/16/2025 Surgical Pathology Report - Final Diagnosis: 1. Right breast, lumpectomy: DCIS (15mm), Cribiform, solid, Gr 2, microcalcs present in DCIS and in nonneoplastic tissue. All margins negative for DCIS.  -Distance from DCIS to Closest Margin:  Less than 1 mm. - Closest Margin(s) to DCIS:   Anterior -Distance from DCIS to Lateral Margin: 1.5 mm 2. Right breast, additional posterior margin, excision: Benign breast tissue with FCC. 3. Right breast, additional medial margin, excision: DCIS, morphologically similar to that seen in part 1. DCIS measures 1.3mm in greatest diameter and is >2 mm from all inked margins including the new medial margin. 4. Right breast, additional inferior margin, excision: Benign breast tissue with FCC.   Fhx: Breast-Niece ? age. 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  CBE:  No hematoma, incision minimally pink but breast is without erythema. Seroma noted. Offered aspiration, pat declines.  Reviewed pathology, Only DCIS seen, Anterior margin is <1 mm, lateral is 1.5 mm (of note shaved medial margin showed additional DCIS so extent CC was wider than expected). No evidence of invasive.   Will need re-excision of anterior and lateral margins. Discussed option of oncoplastic, pt declines.  She saw Dr. Alvarado on 12/26/24.  She will need to see Med/Onc post-op for discussion of using AI for risk reduction since ER+ (95%)/ KS+ (100%). Pt still considering genetic testing, not covered by insurance given DCIS. Plan for Wednesday 1/29. Offered seroma aspiration of R breast in office today although pt declines and prefers to start antibx first given incision slightly pink. Will start on Keflex 250 TID x7 days.  Pt is upset because of additional stress from  who is not doing well healthwise.   PLAN: 1/29 reexcision of anterior and lateral margins. keftlex rx. Med onc post op. pt still thinking about genetic testing, would be out of pocket since DCIS.

## 2025-01-24 NOTE — PHYSICAL EXAM
[de-identified] : PA incision: intact, No hematoma, incision minimally pink but breast is without erythema. Seroma noted. Offered aspiration, pat declines. Pt has very pendulous breasts and seroma more obvious.

## 2025-01-24 NOTE — CONSULT LETTER
[Dear  ___] : Dear ~GENA, [Courtesy Letter:] : I had the pleasure of seeing your patient, [unfilled], in my office today. [Please see my note below.] : Please see my note below. [Sincerely,] : Sincerely, [FreeTextEntry3] : Kelly Mario MD

## 2025-01-24 NOTE — DATA REVIEWED
[FreeTextEntry1] : 1/16/2025 Surgical Pathology Report - Final Diagnosis: 1. Right breast, lumpectomy: DCIS, see synoptic report. 2. Right breast, additional posterior margin, excision: Benign breast tissue with FCC. 3. Right breast, additional medial margin, excision: DCIS, morphologically similar to that seen in part 1. DCIS measures 1.3mm in greatest diameter and is >2 mm from all inked margins including the new medial margin. 4. Right breast, additional inferior margin, excision: Benign breast tissue with FCC.   Synoptic Summary: 1: R Breast: DCIS (15mm), Cribiform, solid, Gr 2, microcalcs present in DCIS and in nonneoplastic tissue.  -All margins negative for DCIS -Distance from DCIS to Closest Margin:  Less than 1 mm  -Closest Margin(s) to DCIS:   Anterior -Distance from DCIS to Lateral Margin: 1.5 mm

## 2025-02-04 NOTE — HISTORY OF PRESENT ILLNESS
[FreeTextEntry1] : Referred by Dr. Tran Perez PCP NATHANIEL Aguiar  Patient is a 79-year-old female here today for post op visit s/p 1/29/25 re-excision of anterior and lateral margin and 1/16/25 R wire loc lumpectomy for DCIS ER/FL+ 95% /100%.  1/16/25 surgical path: Right breast, lumpectomy: DCIS, Gr 2, microcalcs present in DCIS and in nonneoplastic tissue. Distance from DCIS closest margin: <1 mm. Closest Margin(s) to DCIS: Anterior. Distance from DCIS to Lateral Margin: 1.5 mm.  2. R, additional post margin, excision: Benign breast tissue with FCC. 3. R, additional medial margin, excision: DCIS, morphologically similar to that seen in part 1. DCIS measures 1.3mm in greatest diameter and is >2 mm from all inked margins including the new medial margin. 4. R additional inferior margin, excision: Benign breast tissue with FCC.  Breast calcs were initially found on CT to the left breast but further imaging found them to be benign, however indeterminate calcs were found on the Right on breast imaging.  Re-excision path from 1/29 not back yet.  Pt here with her daughter Helen. She states she is doing well, has some discomfort to the right breast that is relieved with gabapentin and NSAIDS. She completed a course of Keflex yesterday. Notes redness and swelling to the right breast post-op.   10/14/2024 SB BINU bilat dMMG and US: Scattered areas of FG density. LEFT- UO breast reveals 2 coarse, benign calcifications. No susp calcs, architectural distortion or mass is seen. RIGHT*-Of incidental note, at the 12:00 right mid breast is focal architectural distortion w/associated heterogenous grouped microcalcs, persistent on additional targeted mmg imaging. This incidental finding is susp for malignancy, with no definitive US correlate. US: LEFT: targeted imaging of the UO breast reveals coarse calcs which correlates to mmg findings. At the UO breast, there is a superficial focus of generalized increased density within the associated 2 mm debris-filled cyst, likely of recent traumatic injury. Coarse calcs 1:00 2cm FN correlates to benign mmg findings. RIGHT*: targeted imaging of the upper right breast reveals multiple debris-filled cysts, 12:00 1cm FN. A vague focus of decreased attenuation w/focal shadowing at the 12:00 position, though sonographically nonspecific. No definitive mass or distortion is seen to correlate to mmg findings above. A minimally dilated duct is noted at the right breast 9:00 1cm FN, minimally dilated ducts or noted at the RA region. Morphologically normal-appearing axillary LN bilat. Imp- Benign calcs at the UO LEFT breast correlates to outside CT. Incidental note of focal architectural distortion w/associated grouped heterogeneous calcs at the 12:00 RIGH* mid breast on mmg, susp for malignancy, as this finding has no definitive sono correlate. Rec- stereotactic bx RIGHT. BR4  11/21/2024 [facility] RIGHT stereotactic core bx Pathology: UO RIGHT- DCIS, intermediate gr, nuclear atypia. Microcals present. ER+ (95%)/ FL+ (100%). Results are CONDORDANT. Of note, residual calcs are seen at the bx site measuring up to 2.3cm in widest AP dimension. Wide excision should be considered at the time of surgery.  12/27/2024 NFR breast MRI: The breast parenchyma is composed of scattered FG tissue. RIGHT BREAST: The bx marker in the location of the recently diagnosed malignancy is associated with non-mass enhancement measuring 2 cm, axial image 36. There are scattered enhancing nonspecific foci. There is no additional suspicious enhancement in the right breast. LEFT BREAST: There are scattered enhancing nonspecific foci. There is no suspicious enhancement in the left breast. AXILLA/OTHER: There is no significant axillary or internal mammary lymphadenopathy. IMPRESSION: Recently diagnosed right breast malignancy. No additional suspicious enhancement. REC: Surgical or oncologic management. BR6.  1/16/2025 Surgical Pathology Report - Final Diagnosis: 1. Right breast, lumpectomy: DCIS (15mm), Cribiform, solid, Gr 2, microcalcs present in DCIS and in nonneoplastic tissue. All margins negative for DCIS. -Distance from DCIS to Closest Margin: Less than 1 mm. - Closest Margin(s) to DCIS: Anterior -Distance from DCIS to Lateral Margin: 1.5 mm 2. Right breast, additional posterior margin, excision: Benign breast tissue with FCC. 3. Right breast, additional medial margin, excision: DCIS, morphologically similar to that seen in part 1. DCIS measures 1.3mm in greatest diameter and is >2 mm from all inked margins including the new medial margin. 4. Right breast, additional inferior margin, excision: Benign breast tissue with FCC.  Fhx: Breast-Niece ? age. 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  CBE: No hematoma, incision minimally pink but breast is with surrounding erythema. Seroma noted. Offered drainage of seroma in office today and pt accepted.  R breast seroma aspirated for 49mL dark sanguinous fluid, not cloudy. Will send for culture. Will give pt additional course of Keflex 250mg TID x7 days.  Final path pending.   PLAN: keflex rx. Culture of R breast seroma aspirate sent F.u. on Friday Final path pending

## 2025-02-04 NOTE — PHYSICAL EXAM
Ureteral Stents  A ureteral stent is a soft plastic tube with holes in it. It’s temporarily inserted into a ureter to help drain urine into the bladder. One end goes in the kidney. The other end goes in the bladder. A coil on each end holds the stent in place. The stent can’t be seen from outside the body. It shouldn’t interfere with your normal routine. Your stent will be put in by a urologist (doctor trained in treating the urinary tract) or another specialist. The procedure is done in a hospital or surgery center. You’ll likely go home the same day.  When Is a Ureteral Stent Used?  A ureteral stent may be used:  · To bypass a blockage in a kidney or ureter.  · During kidney stone removal.  · To let a ureter heal after surgery.    Before the Procedure  Your doctor will give you instructions to prepare for the procedure. X-rays or other imaging tests of your kidneys and ureters may be done beforehand.  During the Procedure  · You receive medication to prevent pain and help you relax or sleep during the procedure. Once this takes effect, the procedure starts.  · The doctor inserts a cystoscope (lighted instrument) through the urethra and into the bladder. This shows the opening to the ureter.  · A thin wire is carefully threaded through the cystoscope, up the ureter, and into the kidney. The stent is inserted over the wire.  · A fluoroscope (special X-ray machine) is used to help position the stent. When the stent is in place, the wire and cystoscope are removed.  While You Have a Stent  · Some discomfort is normal. Certain movements may trigger pain or a feeling that you need to urinate. You may also feel mild soreness or pressure before or during urination. These symptoms will go away a few days after the stent is removed.  · Medication to control pain or bladder spasms or to prevent infection may be prescribed. Take this as directed.  · Drink plenty of fluids to help flush out your urinary tract.  · Your urine  may be slightly pink or red. This is due to bleeding caused by minor irritation from the stent. This may happen on and off while you have the stent.  · As with any synthetic device placed in the body, there is a risk of infection. The stent may have to be removed if this happens.   How Long Will You Need a Stent?  The stent is often taken out after the blockage in the ureter is treated or the ureter has healed. This may take 1 week to 2 weeks, or longer. If a stent is needed for a long time, it may need to be changed every few months.  Call Your Doctor  Contact your doctor right away if:  · Your urine contains blood clots or you see a large amount of blood-tinged urine.   · You have symptoms similar to those you had before the stent was placed.   · You constantly leak urine.  · You have a fever over 100.4°F (38°C), chills, nausea, or vomiting.  · Your pain is not relieved with medication.  · The end of the stent comes out of the urethra.   © 8630-6584 [employer]. [address] [place] [address]. All rights reserved. This information is not intended as a substitute for professional medical care. Always follow your healthcare professional's instructions.      DISCHARGE INSTRUCTIONS:   OUTPATIENT DEPARTMENT AND THE  [facility]    DIET  _X__ As tolerated  _X__Other: encourage fluids to flush bladder/avoid constipation    MEDICATIONS  _X__ Resume your usual medication.  _X__ Prescription given/filled.  _X__ Take the medications that your doctor has prescribed for you.  ___ Avoid any aspirin or aspirin containing products for ____ days.    BATHING  ___ Sponge bath until after your first office visit.  _X__ Shower / Shampoo tomorrow  ___ Tub Bath  ___ No swimming until directed by physician.    ACTIVITY  _X__ Up as tolerated/no restrictions.  _X__ No heavy lifting/light activity for 2 weeks  ___ Return to work or school:  _X__ May drive a car: when not in pain or taking pain medication  ___  Other:    Patient/Family given For your Well Being Teaching Sheet:  _X__ Care After Anesthesia/ Sedation  _X__ Pain Management Tips  _X__ About Surgical Site infection  ___ Smoking and second hand Smoke information  ___ Other:     FOLLOW-UP CARE  _X__ Make an appointment to see your doctor in 2 weeks.  ___ Call for results in ______days    Notify your physician immediately if you experience any of the following symptoms:  • Difficulty with or inability to urinate  • Fever 101 degree or higher.  • Nausea or vomiting that persists longer than 24 hours.  • Pain not relieved by the prescribed medication.  • Heavy bleeding-urine looks thick like ketchup  • Cloudy or foul smelling drainage.    If you have any questions, please call the [facility] at [phone].   [de-identified] : CBE: No hematoma, incision minimally pink but breast is with surrounding erythema. Seroma noted, more obvious given pt's large pendulous breasts. Offered drainage of seroma in office today and pt accepted.

## 2025-02-04 NOTE — ASSESSMENT
[FreeTextEntry1] : Referred by Dr. 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  1/16/25 surgical path: Right breast, lumpectomy: DCIS, Gr 2, microcalcs present in DCIS and in nonneoplastic tissue. Distance from DCIS closest margin: <1 mm. Closest Margin(s) to DCIS: Anterior. Distance from DCIS to Lateral Margin: 1.5 mm.  2. R, additional post margin, excision: Benign breast tissue with FCC. 3. R, additional medial margin, excision: DCIS, morphologically similar to that seen in part 1. DCIS measures 1.3mm in greatest diameter and is >2 mm from all inked margins including the new medial margin. 4. R additional inferior margin, excision: Benign breast tissue with FCC.  Breast calcs were initially found on CT to the left breast but further imaging found them to be benign, however indeterminate calcs were found on the Right on breast imaging.  Re-excision path from 1/29 not back yet.  Pt here with her daughter Helen. She states she is doing well, has some discomfort to the right breast that is relieved with gabapentin and NSAIDS. She completed a course of Keflex yesterday. Notes redness and swelling to the right breast post-op.   10/14/2024 SB BINU bilat dMMG and US: Scattered areas of FG density. LEFT- UO breast reveals 2 coarse, benign calcifications. No susp calcs, architectural distortion or mass is seen. RIGHT*-Of incidental note, at the 12:00 right mid breast is focal architectural distortion w/associated heterogenous grouped microcalcs, persistent on additional targeted mmg imaging. This incidental finding is susp for malignancy, with no definitive US correlate. US: LEFT: targeted imaging of the UO breast reveals coarse calcs which correlates to mmg findings. At the UO breast, there is a superficial focus of generalized increased density within the associated 2 mm debris-filled cyst, likely of recent traumatic injury. Coarse calcs 1:00 2cm FN correlates to benign mmg findings. RIGHT*: targeted imaging of the upper right breast reveals multiple debris-filled cysts, 12:00 1cm FN. A vague focus of decreased attenuation w/focal shadowing at the 12:00 position, though sonographically nonspecific. No definitive mass or distortion is seen to correlate to mmg findings above. A minimally dilated duct is noted at the right breast 9:00 1cm FN, minimally dilated ducts or noted at the RA region. Morphologically normal-appearing axillary LN bilat. Imp- Benign calcs at the UO LEFT breast correlates to outside CT. Incidental note of focal architectural distortion w/associated grouped heterogeneous calcs at the 12:00 RIGH* mid breast on mmg, susp for malignancy, as this finding has no definitive sono correlate. Rec- stereotactic bx RIGHT. BR4  11/21/2024 [facility] RIGHT stereotactic core bx Pathology: UO RIGHT- DCIS, intermediate gr, nuclear atypia. Microcals present. ER+ (95%)/ [place]+ (100%). Results are CONDORDANT. Of note, residual calcs are seen at the bx site measuring up to 2.3cm in widest AP dimension. Wide excision should be considered at the time of surgery.  12/27/2024 NFR breast MRI: The breast parenchyma is composed of scattered FG tissue. RIGHT BREAST: The bx marker in the location of the recently diagnosed malignancy is associated with non-mass enhancement measuring 2 cm, axial image 36. There are scattered enhancing nonspecific foci. There is no additional suspicious enhancement in the right breast. LEFT BREAST: There are scattered enhancing nonspecific foci. There is no suspicious enhancement in the left breast. AXILLA/OTHER: There is no significant axillary or internal mammary lymphadenopathy. IMPRESSION: Recently diagnosed right breast malignancy. No additional suspicious enhancement. REC: Surgical or oncologic management. BR6.  1/16/2025 Surgical Pathology Report - Final Diagnosis: 1. Right breast, lumpectomy: DCIS (15mm), Cribiform, solid, Gr 2, microcalcs present in DCIS and in nonneoplastic tissue. All margins negative for DCIS. -Distance from DCIS to Closest Margin: Less than 1 mm. - Closest Margin(s) to DCIS: Anterior -Distance from DCIS to Lateral Margin: 1.5 mm 2. Right breast, additional posterior margin, excision: Benign breast tissue with FCC. 3. Right breast, additional medial margin, excision: DCIS, morphologically similar to that seen in part 1. DCIS measures 1.3mm in greatest diameter and is >2 mm from all inked margins including the new medial margin. 4. Right breast, additional inferior margin, excision: Benign breast tissue with FCC.  Fhx: Breast-Niece ? age. 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  CBE: No hematoma, incision minimally pink but breast is with surrounding erythema. Seroma noted. Offered drainage of seroma in office today and pt accepted.  R breast seroma aspirated for 49mL dark sanguinous fluid, not cloudy. Will send for culture. Will give pt additional course of Keflex 250mg TID x7 days.  Final path pending.  Rec Ice pack to wound.  PLAN: Keflex rx. Culture of R breast seroma aspirate sent F.u. on Friday Final path pending

## 2025-02-04 NOTE — PHYSICAL EXAM
[de-identified] : CBE: No hematoma, incision minimally pink but breast is with surrounding erythema. Seroma noted, more obvious given pt's large pendulous breasts. Offered drainage of seroma in office today and pt accepted.

## 2025-02-04 NOTE — ASSESSMENT
[FreeTextEntry1] : Referred by Dr. Tran Perez PCP NATHANIEL Aguiar  Patient is a 79-year-old female here today for post op visit s/p 1/29/25 re-excision of anterior and lateral margin and 1/16/25 R wire loc lumpectomy for DCIS ER/[place]+ 95% /100%.  1/16/25 surgical path: Right breast, lumpectomy: DCIS, Gr 2, microcalcs present in DCIS and in nonneoplastic tissue. Distance from DCIS closest margin: <1 mm. Closest Margin(s) to DCIS: Anterior. Distance from DCIS to Lateral Margin: 1.5 mm.  2. R, additional post margin, excision: Benign breast tissue with FCC. 3. R, additional medial margin, excision: DCIS, morphologically similar to that seen in part 1. DCIS measures 1.3mm in greatest diameter and is >2 mm from all inked margins including the new medial margin. 4. R additional inferior margin, excision: Benign breast tissue with FCC.  Breast calcs were initially found on CT to the left breast but further imaging found them to be benign, however indeterminate calcs were found on the Right on breast imaging.  Re-excision path from 1/29 not back yet.  Pt here with her daughter Helen. She states she is doing well, has some discomfort to the right breast that is relieved with gabapentin and NSAIDS. She completed a course of Keflex yesterday. Notes redness and swelling to the right breast post-op.   10/14/2024 SB BINU bilat dMMG and US: Scattered areas of FG density. LEFT- UO breast reveals 2 coarse, benign calcifications. No susp calcs, architectural distortion or mass is seen. RIGHT*-Of incidental note, at the 12:00 right mid breast is focal architectural distortion w/associated heterogenous grouped microcalcs, persistent on additional targeted mmg imaging. This incidental finding is susp for malignancy, with no definitive US correlate. US: LEFT: targeted imaging of the UO breast reveals coarse calcs which correlates to mmg findings. At the UO breast, there is a superficial focus of generalized increased density within the associated 2 mm debris-filled cyst, likely of recent traumatic injury. Coarse calcs 1:00 2cm FN correlates to benign mmg findings. RIGHT*: targeted imaging of the upper right breast reveals multiple debris-filled cysts, 12:00 1cm FN. A vague focus of decreased attenuation w/focal shadowing at the 12:00 position, though sonographically nonspecific. No definitive mass or distortion is seen to correlate to mmg findings above. A minimally dilated duct is noted at the right breast 9:00 1cm FN, minimally dilated ducts or noted at the RA region. Morphologically normal-appearing axillary LN bilat. Imp- Benign calcs at the UO LEFT breast correlates to outside CT. Incidental note of focal architectural distortion w/associated grouped heterogeneous calcs at the 12:00 RIGH* mid breast on mmg, susp for malignancy, as this finding has no definitive sono correlate. Rec- stereotactic bx RIGHT. BR4  11/21/2024 [facility] RIGHT stereotactic core bx Pathology: UO RIGHT- DCIS, intermediate gr, nuclear atypia. Microcals present. ER+ (95%)/ [place]+ (100%). Results are CONDORDANT. Of note, residual calcs are seen at the bx site measuring up to 2.3cm in widest AP dimension. Wide excision should be considered at the time of surgery.  12/27/2024 NFR breast MRI: The breast parenchyma is composed of scattered FG tissue. RIGHT BREAST: The bx marker in the location of the recently diagnosed malignancy is associated with non-mass enhancement measuring 2 cm, axial image 36. There are scattered enhancing nonspecific foci. There is no additional suspicious enhancement in the right breast. LEFT BREAST: There are scattered enhancing nonspecific foci. There is no suspicious enhancement in the left breast. AXILLA/OTHER: There is no significant axillary or internal mammary lymphadenopathy. IMPRESSION: Recently diagnosed right breast malignancy. No additional suspicious enhancement. REC: Surgical or oncologic management. BR6.  1/16/2025 Surgical Pathology Report - Final Diagnosis: 1. Right breast, lumpectomy: DCIS (15mm), Cribiform, solid, Gr 2, microcalcs present in DCIS and in nonneoplastic tissue. All margins negative for DCIS. -Distance from DCIS to Closest Margin: Less than 1 mm. - Closest Margin(s) to DCIS: Anterior -Distance from DCIS to Lateral Margin: 1.5 mm 2. Right breast, additional posterior margin, excision: Benign breast tissue with FCC. 3. Right breast, additional medial margin, excision: DCIS, morphologically similar to that seen in part 1. DCIS measures 1.3mm in greatest diameter and is >2 mm from all inked margins including the new medial margin. 4. Right breast, additional inferior margin, excision: Benign breast tissue with FCC.  Fhx: Breast-Niece ? age. 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  CBE: No hematoma, incision minimally pink but breast is with surrounding erythema. Seroma noted. Offered drainage of seroma in office today and pt accepted.  R breast seroma aspirated for 49mL dark sanguinous fluid, not cloudy. Will send for culture. Will give pt additional course of Keflex 250mg TID x7 days.  Final path pending.  Rec Ice pack to wound.  PLAN: Keflex rx. Culture of R breast seroma aspirate sent F.u. on Friday Final path pending

## 2025-02-04 NOTE — PROCEDURE
[FreeTextEntry1] : aspiration of lumpectomy seroma [FreeTextEntry2] : discomfort from weight of seroma [FreeTextEntry3] : Procedure for aspiration was discussed with patient and consent was signed.  The R obreast was prepped with alcohol.  A 21 gauge needle used for aspiration in seroma and 49 ml of dark sanguinous fluid aspirated and discarded and cultures sent for evaluation.  The patient tolerated the procedure well and hemostasis was excellent. A bandage was placed over the site.

## 2025-02-04 NOTE — HISTORY OF PRESENT ILLNESS
[FreeTextEntry1] : Referred by Dr. Tran Perez PCP NATHANIEL Aguiar  Patient is a 79-year-old female here today for post op visit s/p 1/29/25 re-excision of anterior and lateral margin and 1/16/25 R wire loc lumpectomy for DCIS ER/[place]+ 95% /100%.  1/16/25 surgical path: Right breast, lumpectomy: DCIS, Gr 2, microcalcs present in DCIS and in nonneoplastic tissue. Distance from DCIS closest margin: <1 mm. Closest Margin(s) to DCIS: Anterior. Distance from DCIS to Lateral Margin: 1.5 mm.  2. R, additional post margin, excision: Benign breast tissue with FCC. 3. R, additional medial margin, excision: DCIS, morphologically similar to that seen in part 1. DCIS measures 1.3mm in greatest diameter and is >2 mm from all inked margins including the new medial margin. 4. R additional inferior margin, excision: Benign breast tissue with FCC.  Breast calcs were initially found on CT to the left breast but further imaging found them to be benign, however indeterminate calcs were found on the Right on breast imaging.  Re-excision path from 1/29 not back yet.  Pt here with her daughter Helen. She states she is doing well, has some discomfort to the right breast that is relieved with gabapentin and NSAIDS. She completed a course of Keflex yesterday. Notes redness and swelling to the right breast post-op.   10/14/2024 SB BINU bilat dMMG and US: Scattered areas of FG density. LEFT- UO breast reveals 2 coarse, benign calcifications. No susp calcs, architectural distortion or mass is seen. RIGHT*-Of incidental note, at the 12:00 right mid breast is focal architectural distortion w/associated heterogenous grouped microcalcs, persistent on additional targeted mmg imaging. This incidental finding is susp for malignancy, with no definitive US correlate. US: LEFT: targeted imaging of the UO breast reveals coarse calcs which correlates to mmg findings. At the UO breast, there is a superficial focus of generalized increased density within the associated 2 mm debris-filled cyst, likely of recent traumatic injury. Coarse calcs 1:00 2cm FN correlates to benign mmg findings. RIGHT*: targeted imaging of the upper right breast reveals multiple debris-filled cysts, 12:00 1cm FN. A vague focus of decreased attenuation w/focal shadowing at the 12:00 position, though sonographically nonspecific. No definitive mass or distortion is seen to correlate to mmg findings above. A minimally dilated duct is noted at the right breast 9:00 1cm FN, minimally dilated ducts or noted at the RA region. Morphologically normal-appearing axillary LN bilat. Imp- Benign calcs at the UO LEFT breast correlates to outside CT. Incidental note of focal architectural distortion w/associated grouped heterogeneous calcs at the 12:00 RIGH* mid breast on mmg, susp for malignancy, as this finding has no definitive sono correlate. Rec- stereotactic bx RIGHT. BR4  11/21/2024 [facility] RIGHT stereotactic core bx Pathology: UO RIGHT- DCIS, intermediate gr, nuclear atypia. Microcals present. ER+ (95%)/ [place]+ (100%). Results are CONDORDANT. Of note, residual calcs are seen at the bx site measuring up to 2.3cm in widest AP dimension. Wide excision should be considered at the time of surgery.  12/27/2024 NFR breast MRI: The breast parenchyma is composed of scattered FG tissue. RIGHT BREAST: The bx marker in the location of the recently diagnosed malignancy is associated with non-mass enhancement measuring 2 cm, axial image 36. There are scattered enhancing nonspecific foci. There is no additional suspicious enhancement in the right breast. LEFT BREAST: There are scattered enhancing nonspecific foci. There is no suspicious enhancement in the left breast. AXILLA/OTHER: There is no significant axillary or internal mammary lymphadenopathy. IMPRESSION: Recently diagnosed right breast malignancy. No additional suspicious enhancement. REC: Surgical or oncologic management. BR6.  1/16/2025 Surgical Pathology Report - Final Diagnosis: 1. Right breast, lumpectomy: DCIS (15mm), Cribiform, solid, Gr 2, microcalcs present in DCIS and in nonneoplastic tissue. All margins negative for DCIS. -Distance from DCIS to Closest Margin: Less than 1 mm. - Closest Margin(s) to DCIS: Anterior -Distance from DCIS to Lateral Margin: 1.5 mm 2. Right breast, additional posterior margin, excision: Benign breast tissue with FCC. 3. Right breast, additional medial margin, excision: DCIS, morphologically similar to that seen in part 1. DCIS measures 1.3mm in greatest diameter and is >2 mm from all inked margins including the new medial margin. 4. Right breast, additional inferior margin, excision: Benign breast tissue with FCC.  Fhx: Breast-Niece ? age. 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  CBE: No hematoma, incision minimally pink but breast is with surrounding erythema. Seroma noted. Offered drainage of seroma in office today and pt accepted.  R breast seroma aspirated for 49mL dark sanguinous fluid, not cloudy. Will send for culture. Will give pt additional course of Keflex 250mg TID x7 days.  Final path pending.   PLAN: keflex rx. Culture of R breast seroma aspirate sent F.u. on Friday Final path pending

## 2025-02-07 NOTE — HISTORY OF PRESENT ILLNESS
[FreeTextEntry1] : Referred by Dr. Tran Perez PCP NATHANIEL Aguiar  Patient is a 79-year-old female here today for post op visit s/p 1/29/25 re-excision of anterior and lateral margin and 1/16/25 R wire loc lumpectomy for DCIS ER/[place]+ 95% /100%. 1/29/25 Re-excision path: R addt'l lateral margin: benign tissue w/sclerosisng adenosis, calcs, and bx changes. R addt'l anterior margin: ADH, single focus, partly involving sclerosing adenosis, bx changes.  1/16/25 surgical path: R lumpectomy: DCIS, Gr 2, microcalcs present in DCIS and in nonneoplastic tissue. Closest margin: <1 mm, Anterior and Lateral Margin distance: 1.5 mm. 2. R, additional post margin, excision: Benign breast tissue with FCC. 3. R, additional medial margin, excision: DCIS, morphologically similar to that seen in part 1. DCIS measures 1.3mm in greatest diameter and is >2 mm from all inked margins including the new medial margin. 4. R additional inferior margin, excision: Benign breast tissue with FCC.  Pt here  today by herself. She states she is doing well and feels much better. She states she has no further discomfort at this time, and notices R breast with less redness and swelling. Continues on keflex.  Re-excision path from 1/29 with benign breast tissue and ADH.  R breast seroma drained last visit for 49mL dark sanguinous fluid and sent for culture. Prelim culture from 2/4 with no growth. Additional course of Keflex sent for pt.   She saw Rad/Onc Dr. Alvarado pre-op. Has not seen Med/Onc yet, will need to see Dr. Zamorano given ER/CA+.  Breast calcs were initially found on CT to the left breast but further imaging found them to be benign, however indeterminate calcs were found on the Right on breast imaging.  10/14/2024 SB BINU bilat dMMG and US: Scattered areas of FG density. LEFT- UO breast reveals 2 coarse, benign calcifications. No susp calcs, architectural distortion or mass is seen. RIGHT*-Of incidental note, at the 12:00 right mid breast is focal architectural distortion w/associated heterogenous grouped microcalcs, persistent on additional targeted mmg imaging. This incidental finding is susp for malignancy, with no definitive US correlate. US: LEFT: targeted imaging of the UO breast reveals coarse calcs which correlates to mmg findings. At the UO breast, there is a superficial focus of generalized increased density within the associated 2 mm debris-filled cyst, likely of recent traumatic injury. Coarse calcs 1:00 2cm FN correlates to benign mmg findings. RIGHT*: targeted imaging of the upper right breast reveals multiple debris-filled cysts, 12:00 1cm FN. A vague focus of decreased attenuation w/focal shadowing at the 12:00 position, though sonographically nonspecific. No definitive mass or distortion is seen to correlate to mmg findings above. A minimally dilated duct is noted at the right breast 9:00 1cm FN, minimally dilated ducts or noted at the RA region. Morphologically normal-appearing axillary LN bilat. Imp- Benign calcs at the UO LEFT breast correlates to outside CT. Incidental note of focal architectural distortion w/associated grouped heterogeneous calcs at the 12:00 RIGH* mid breast on mmg, susp for malignancy, as this finding has no definitive sono correlate. Rec- stereotactic bx RIGHT. BR4  11/21/2024 [facility] RIGHT stereotactic core bx Pathology: UO RIGHT- DCIS, intermediate gr, nuclear atypia. Microcals present. ER+ (95%)/ [place]+ (100%). Results are CONDORDANT. Of note, residual calcs are seen at the bx site measuring up to 2.3cm in widest AP dimension. Wide excision should be considered at the time of surgery.  12/27/2024 NFR breast MRI: The breast parenchyma is composed of scattered FG tissue. RIGHT BREAST: The bx marker in the location of the recently diagnosed malignancy is associated with non-mass enhancement measuring 2 cm, axial image 36. There are scattered enhancing nonspecific foci. There is no additional suspicious enhancement in the right breast. LEFT BREAST: There are scattered enhancing nonspecific foci. There is no suspicious enhancement in the left breast. AXILLA/OTHER: There is no significant axillary or internal mammary lymphadenopathy. IMPRESSION: Recently diagnosed right breast malignancy. No additional suspicious enhancement. REC: Surgical or oncologic management. BR6.  1/16/2025 Surgical Pathology Report - Final Diagnosis:  1. R lumpectomy: DCIS, Gr 2, microcalcs present in DCIS and in nonneoplastic tissue. Closest margin: <1 mm, Anterior and Lateral Margin distance: 1.5 mm. 2. R, additional post margin, excision: Benign breast tissue with FCC.  3. R, additional medial margin, excision: DCIS, morphologically similar to that seen in part 1. DCIS measures 1.3mm in greatest diameter and is >2 mm from all inked margins including the new medial margin.  4. R additional inferior margin, excision: Benign breast tissue with FCC.  1/29/25 Re-excision path: R addt'l lateral margin: benign tissue w/sclerosisng adenosis, calcs, and bx changes. R addt'l anterior margin: ADH, single focus, partly involving sclerosing adenosis, bx changes.   Fhx: Breast-Niece ? age. 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

## 2025-02-07 NOTE — ASSESSMENT
[FreeTextEntry1] : Referred by Dr. Tran Perez PCP NATHANIEL Aguiar  Patient is a 79-year-old female here today for post op visit s/p 1/29/25 re-excision of anterior and lateral margin and 1/16/25 R wire loc lumpectomy for DCIS ER/SC+ 95% /100%. 1/29/25 Re-excision path: R addt'l lateral margin: benign tissue w/sclerosisng adenosis, calcs, and bx changes. R addt'l anterior margin: ADH, single focus, partly involving sclerosing adenosis, bx changes.  1/16/25 surgical path: R lumpectomy: DCIS, Gr 2, microcalcs present in DCIS and in nonneoplastic tissue. Closest margin: <1 mm, Anterior and Lateral Margin distance: 1.5 mm. 2. R, additional post margin, excision: Benign breast tissue with FCC. 3. R, additional medial margin, excision: DCIS, morphologically similar to that seen in part 1. DCIS measures 1.3mm in greatest diameter and is >2 mm from all inked margins including the new medial margin. 4. R additional inferior margin, excision: Benign breast tissue with FCC.  Pt here  today by herself. She states she is doing well and feels much better. She states she has no further discomfort at this time, and notices R breast with less redness and swelling. Continues on keflex. States the breast skin is a little dry, hasn't used any moisturizer.  Re-excision path from 1/29 with benign breast tissue and ADH.  R breast seroma drained last visit for 49mL dark sanguinous fluid and sent for culture. Prelim culture from 2/4 with no growth. Additional course of Keflex sent for pt.   She saw Rad/Onc Dr. Alvarado pre-op. Has not seen Med/Onc yet, will need to see Dr. Zamorano given ER/SC+.  Breast calcs were initially found on CT to the left breast but further imaging found them to be benign, however indeterminate calcs were found on the Right on breast imaging.  10/14/2024 SB BINU bilat dMMG and US: Scattered areas of FG density. LEFT- UO breast reveals 2 coarse, benign calcifications. No susp calcs, architectural distortion or mass is seen. RIGHT*-Of incidental note, at the 12:00 right mid breast is focal architectural distortion w/associated heterogenous grouped microcalcs, persistent on additional targeted mmg imaging. This incidental finding is susp for malignancy, with no definitive US correlate. US: LEFT: targeted imaging of the UO breast reveals coarse calcs which correlates to mmg findings. At the UO breast, there is a superficial focus of generalized increased density within the associated 2 mm debris-filled cyst, likely of recent traumatic injury. Coarse calcs 1:00 2cm FN correlates to benign mmg findings. RIGHT*: targeted imaging of the upper right breast reveals multiple debris-filled cysts, 12:00 1cm FN. A vague focus of decreased attenuation w/focal shadowing at the 12:00 position, though sonographically nonspecific. No definitive mass or distortion is seen to correlate to mmg findings above. A minimally dilated duct is noted at the right breast 9:00 1cm FN, minimally dilated ducts or noted at the RA region. Morphologically normal-appearing axillary LN bilat. Imp- Benign calcs at the UO LEFT breast correlates to outside CT. Incidental note of focal architectural distortion w/associated grouped heterogeneous calcs at the 12:00 RIGH* mid breast on mmg, susp for malignancy, as this finding has no definitive sono correlate. Rec- stereotactic bx RIGHT. BR4  11/21/2024 [facility] stereotactic core bx Pathology: UO RIGHT- DCIS, intermediate gr, nuclear atypia. Microcals present. ER+ (95%)/ SC+ (100%). Results are CONDORDANT. Of note, residual calcs are seen at the bx site measuring up to 2.3cm in widest AP dimension. Wide excision should be considered at the time of surgery.  12/27/2024 NFR breast MRI: The breast parenchyma is composed of scattered FG tissue. RIGHT BREAST: The bx marker in the location of the recently diagnosed malignancy is associated with non-mass enhancement measuring 2 cm, axial image 36. There are scattered enhancing nonspecific foci. There is no additional suspicious enhancement in the right breast. LEFT BREAST: There are scattered enhancing nonspecific foci. There is no suspicious enhancement in the left breast. AXILLA/OTHER: There is no significant axillary or internal mammary lymphadenopathy. IMPRESSION: Recently diagnosed right breast malignancy. No additional suspicious enhancement. REC: Surgical or oncologic management. BR6.  1/16/2025 Surgical Pathology Report - Final Diagnosis:  1. R lumpectomy: DCIS, Gr 2, microcalcs present in DCIS and in nonneoplastic tissue. Closest margin: <1 mm, Anterior and Lateral Margin distance: 1.5 mm. 2. R, additional post margin, excision: Benign breast tissue with FCC.  3. R, additional medial margin, excision: DCIS, morphologically similar to that seen in part 1. DCIS measures 1.3mm in greatest diameter and is >2 mm from all inked margins including the new medial margin.  4. R additional inferior margin, excision: Benign breast tissue with FCC.  1/29/25 Re-excision path: R addt'l lateral margin: benign tissue w/sclerosisng adenosis, calcs, and bx changes. R addt'l anterior margin: ADH, single focus, partly involving sclerosing adenosis, bx changes.   Fhx: Breast-Niece ? age. 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  CBE: No hematoma, incision minimally pink but breast erythema, improved from prior visit. Small seroma noted, also improved from prior visit, offered drainage to pt to which she declines since not bothersome.   Final re-excision path reviewed, benign tissue with scelrosis adenosis, ADH single focus. F.u. with Rad/Onc Dr. Alvarado for initation of EBRT post-op. To see Med/Onc Dr. Harkins given ER/SC+ tumor for AI discussion after RT, F.u. next Thursday for seroma check, if pt notices further pain, swelling or redness to f.u. sooner. Continue Keflex course, pending final cx results. Can use topical vanicream for dry skin to R breast, continue with ice packs to R breast.   PLAN: Continue Keflex course. F..u with Rad/Onc Dr. Alvarado. To see Med/Onc Dr. Harkins. F.u. next Thursday for seroma and incision check, if any issues f.u. sooner prn.

## 2025-02-07 NOTE — DATA REVIEWED
[FreeTextEntry1] : 1/29/25 Re-excision path: R addt'l lateral margin: benign tissue w/sclerosisng adenosis, calcs, and bx changes. R addt'l anterior margin: ADH, single focus, partly involving sclerosing adenosis, bx changes.

## 2025-02-07 NOTE — PHYSICAL EXAM
[No Rashes] : no rashes [No Ulceration] : no ulceration [de-identified] : No hematoma, incision minimally pink but breast erythema improved from prior visit. Small seroma noted, also improved from last visit.  Pt declines aspiration for now.

## 2025-02-13 NOTE — HISTORY OF PRESENT ILLNESS
[FreeTextEntry1] : Referred by Dr. Tran Perez PCP NATHANIEL Aguiar  Patient is a 79-year-old female here today for post op visit today for seroma and incision check. She is s/p 1/29/25 re-excision of anterior and lateral margin and 1/16/25 R wire loc lumpectomy for DCIS ER/IN+ 95% /100%. 1/29/25 Re-excision path: R addt'l lateral margin: benign tissue w/sclerosisng adenosis, calcs, and bx changes. R addt'l anterior margin: ADH, single focus, partly involving sclerosing adenosis, bx changes. 1/16/25 surgical path: R lumpectomy: DCIS, Gr 2, microcalcs present in DCIS and in nonneoplastic tissue. Closest margin: <1 mm, Anterior and Lateral Margin distance: 1.5 mm. 2. R, additional post margin, excision: Benign breast tissue with FCC. 3. R, additional medial margin, excision: DCIS, morphologically similar to that seen in part 1. DCIS measures 1.3mm in greatest diameter and is >2 mm from all inked margins including the new medial margin. 4. R additional inferior margin, excision: Benign breast tissue with FCC.  Pt presents today saying she feels much better. No further swelling ore redness noted to the R breast, no discomfort reported. She reports she went to the ER on Sunday because she started to have left shoulder/arm pain since Friday, she was told her Xrays and CT scan showed possible tendonitis ? She is to f.u. with Orthopedist.  Completed Keflex course x2, final wound culture with no growth from 2/4. Finishing last doses today.  Final re-excision path from 1/29 with benign breast tissue and ADH. She saw Rad/Onc Dr. Alvarado pre-op, she is scheduled to see him again 2/21. She is scheduled to see Med/Onc Dr. Harkins on 3/6.   Breast calcs were initially found on CT to the left breast but further imaging found them to be benign, however indeterminate calcs were found on the Right on breast imaging.  10/14/2024 SB BINU bilat dMMG and US: Scattered areas of FG density. LEFT- UO breast reveals 2 coarse, benign calcifications. No susp calcs, architectural distortion or mass is seen. RIGHT*-Of incidental note, at the 12:00 right mid breast is focal architectural distortion w/associated heterogenous grouped microcalcs, persistent on additional targeted mmg imaging. This incidental finding is susp for malignancy, with no definitive US correlate. US: LEFT: targeted imaging of the UO breast reveals coarse calcs which correlates to mmg findings. At the UO breast, there is a superficial focus of generalized increased density within the associated 2 mm debris-filled cyst, likely of recent traumatic injury. Coarse calcs 1:00 2cm FN correlates to benign mmg findings. RIGHT*: targeted imaging of the upper right breast reveals multiple debris-filled cysts, 12:00 1cm FN. A vague focus of decreased attenuation w/focal shadowing at the 12:00 position, though sonographically nonspecific. No definitive mass or distortion is seen to correlate to mmg findings above. A minimally dilated duct is noted at the right breast 9:00 1cm FN, minimally dilated ducts or noted at the RA region. Morphologically normal-appearing axillary LN bilat. Imp- Benign calcs at the UO LEFT breast correlates to outside CT. Incidental note of focal architectural distortion w/associated grouped heterogeneous calcs at the 12:00 RIGH* mid breast on mmg, susp for malignancy, as this finding has no definitive sono correlate. Rec- stereotactic bx RIGHT. BR4  11/21/2024 [facility] stereotactic core bx Pathology: UO RIGHT- DCIS, intermediate gr, nuclear atypia. Microcals present. ER+ (95%)/ IN+ (100%). Results are CONDORDANT. Of note, residual calcs are seen at the bx site measuring up to 2.3cm in widest AP dimension. Wide excision should be considered at the time of surgery.  12/27/2024 NFR breast MRI: The breast parenchyma is composed of scattered FG tissue. RIGHT BREAST: The bx marker in the location of the recently diagnosed malignancy is associated with non-mass enhancement measuring 2 cm, axial image 36. There are scattered enhancing nonspecific foci. There is no additional suspicious enhancement in the right breast. LEFT BREAST: There are scattered enhancing nonspecific foci. There is no suspicious enhancement in the left breast. AXILLA/OTHER: There is no significant axillary or internal mammary lymphadenopathy. IMPRESSION: Recently diagnosed right breast malignancy. No additional suspicious enhancement. REC: Surgical or oncologic management. BR6.  1/16/2025 Surgical Pathology Report - Final Diagnosis: 1. R lumpectomy: DCIS, Gr 2, microcalcs present in DCIS and in nonneoplastic tissue. Closest margin: <1 mm, Anterior and Lateral Margin distance: 1.5 mm. 2. R, additional post margin, excision: Benign breast tissue with FCC. 3. R, additional medial margin, excision: DCIS, morphologically similar to that seen in part 1. DCIS measures 1.3mm in greatest diameter and is >2 mm from all inked margins including the new medial margin. 4. R additional inferior margin, excision: Benign breast tissue with FCC.  1/29/25 Re-excision path: R addt'l lateral margin: benign tissue w/sclerosisng adenosis, calcs, and bx changes. R addt'l anterior margin: ADH, single focus, partly involving sclerosing adenosis, bx changes.  Fhx: Breast-Niece ? age. 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

## 2025-02-13 NOTE — PHYSICAL EXAM
[de-identified] :  Right breast: No hematoma, incision intact, no evidence of cellulitis, seroma present and pt offered drainage to pt to which she declines since not bothersome.

## 2025-02-13 NOTE — ASSESSMENT
[FreeTextEntry1] : Referred by Dr. Tran Perez PCP NATHANIEL Aguiar  Patient is a 79-year-old female here today for post op visit today for seroma and incision check. She is s/p 1/29/25 re-excision of anterior and lateral margin and 1/16/25 R wire loc lumpectomy for DCIS ER/OK+ 95% /100%. 1/29/25 Re-excision path: R addt'l lateral margin: benign tissue w/sclerosisng adenosis, calcs, and bx changes. R addt'l anterior margin: ADH, single focus, partly involving sclerosing adenosis, bx changes. 1/16/25 surgical path: R lumpectomy: DCIS, Gr 2, microcalcs present in DCIS and in nonneoplastic tissue. Closest margin: <1 mm, Anterior and Lateral Margin distance: 1.5 mm. 2. R, additional post margin, excision: Benign breast tissue with FCC. 3. R, additional medial margin, excision: DCIS, morphologically similar to that seen in part 1. DCIS measures 1.3mm in greatest diameter and is >2 mm from all inked margins including the new medial margin. 4. R additional inferior margin, excision: Benign breast tissue with FCC.  Pt presents today saying she feels much better. No further swelling ore redness noted to the R breast, no discomfort reported. She reports she went to the ER on Sunday because she started to have left shoulder/arm pain since Friday, she was told her Xrays and CT scan showed possible tendonitis ? She is to f.u. with Orthopedist.  Completed Keflex course x2, final wound culture with no growth from 2/4. Finishing last doses today.  Final re-excision path from 1/29 with benign breast tissue and ADH. She saw Rad/Onc Dr. Alvarado pre-op, she is scheduled to see him again 2/21. She is scheduled to see Med/Onc Dr. Harkins on 3/6.   Breast calcs were initially found on CT to the left breast but further imaging found them to be benign, however indeterminate calcs were found on the Right on breast imaging.  10/14/2024 SB BINU bilat dMMG and US: Scattered areas of FG density. LEFT- UO breast reveals 2 coarse, benign calcifications. No susp calcs, architectural distortion or mass is seen. RIGHT*-Of incidental note, at the 12:00 right mid breast is focal architectural distortion w/associated heterogenous grouped microcalcs, persistent on additional targeted mmg imaging. This incidental finding is susp for malignancy, with no definitive US correlate. US: LEFT: targeted imaging of the UO breast reveals coarse calcs which correlates to mmg findings. At the UO breast, there is a superficial focus of generalized increased density within the associated 2 mm debris-filled cyst, likely of recent traumatic injury. Coarse calcs 1:00 2cm FN correlates to benign mmg findings. RIGHT*: targeted imaging of the upper right breast reveals multiple debris-filled cysts, 12:00 1cm FN. A vague focus of decreased attenuation w/focal shadowing at the 12:00 position, though sonographically nonspecific. No definitive mass or distortion is seen to correlate to mmg findings above. A minimally dilated duct is noted at the right breast 9:00 1cm FN, minimally dilated ducts or noted at the RA region. Morphologically normal-appearing axillary LN bilat. Imp- Benign calcs at the UO LEFT breast correlates to outside CT. Incidental note of focal architectural distortion w/associated grouped heterogeneous calcs at the 12:00 RIGH* mid breast on mmg, susp for malignancy, as this finding has no definitive sono correlate. Rec- stereotactic bx RIGHT. BR4  11/21/2024 [facility] stereotactic core bx Pathology: UO RIGHT- DCIS, intermediate gr, nuclear atypia. Microcals present. ER+ (95%)/ OK+ (100%). Results are CONDORDANT. Of note, residual calcs are seen at the bx site measuring up to 2.3cm in widest AP dimension. Wide excision should be considered at the time of surgery.  12/27/2024 NFR breast MRI: The breast parenchyma is composed of scattered FG tissue. RIGHT BREAST: The bx marker in the location of the recently diagnosed malignancy is associated with non-mass enhancement measuring 2 cm, axial image 36. There are scattered enhancing nonspecific foci. There is no additional suspicious enhancement in the right breast. LEFT BREAST: There are scattered enhancing nonspecific foci. There is no suspicious enhancement in the left breast. AXILLA/OTHER: There is no significant axillary or internal mammary lymphadenopathy. IMPRESSION: Recently diagnosed right breast malignancy. No additional suspicious enhancement. REC: Surgical or oncologic management. BR6.  1/16/2025 Surgical Pathology Report - Final Diagnosis: 1. R lumpectomy: DCIS, Gr 2, microcalcs present in DCIS and in nonneoplastic tissue. Closest margin: <1 mm, Anterior and Lateral Margin distance: 1.5 mm. 2. R, additional post margin, excision: Benign breast tissue with FCC. 3. R, additional medial margin, excision: DCIS, morphologically similar to that seen in part 1. DCIS measures 1.3mm in greatest diameter and is >2 mm from all inked margins including the new medial margin. 4. R additional inferior margin, excision: Benign breast tissue with FCC.  1/29/25 Re-excision path: R addt'l lateral margin: benign tissue w/sclerosisng adenosis, calcs, and bx changes. R addt'l anterior margin: ADH, single focus, partly involving sclerosing adenosis, bx changes.  Fhx: Breast-Niece ? age. 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  CBE: No hematoma, incision intact, no evidence of cellulitis, seroma present and pt offered drainage to pt to which she declines since not bothersome.  Final re-excision path reviewed, benign tissue with sclerosing adenosis, ADH single focus. 2/21 appt with Rad/Onc Dr. Alvarado for initation of EBRT post-op. To see Med/Onc Dr. Harkins given ER/OK+ tumor for AI discussion after RT, Finish  PLAN: Continue Keflex course. F..u with Rad/Onc Dr. Alvarado 2/21 To see Med/Onc Dr. Harkins. 3/6 F/u 1 month, sooner as needed.

## 2025-02-13 NOTE — PHYSICAL EXAM
[de-identified] :  Right breast: No hematoma, incision intact, no evidence of cellulitis, seroma present and pt offered drainage to pt to which she declines since not bothersome.

## 2025-02-13 NOTE — HISTORY OF PRESENT ILLNESS
[FreeTextEntry1] : Referred by Dr. Tran Perez PCP NATHANIEL Aguiar  Patient is a 79-year-old female here today for post op visit today for seroma and incision check. She is s/p 1/29/25 re-excision of anterior and lateral margin and 1/16/25 R wire loc lumpectomy for DCIS ER/MT+ 95% /100%. 1/29/25 Re-excision path: R addt'l lateral margin: benign tissue w/sclerosisng adenosis, calcs, and bx changes. R addt'l anterior margin: ADH, single focus, partly involving sclerosing adenosis, bx changes. 1/16/25 surgical path: R lumpectomy: DCIS, Gr 2, microcalcs present in DCIS and in nonneoplastic tissue. Closest margin: <1 mm, Anterior and Lateral Margin distance: 1.5 mm. 2. R, additional post margin, excision: Benign breast tissue with FCC. 3. R, additional medial margin, excision: DCIS, morphologically similar to that seen in part 1. DCIS measures 1.3mm in greatest diameter and is >2 mm from all inked margins including the new medial margin. 4. R additional inferior margin, excision: Benign breast tissue with FCC.  Pt presents today saying she feels much better. No further swelling ore redness noted to the R breast, no discomfort reported. She reports she went to the ER on Sunday because she started to have left shoulder/arm pain since Friday, she was told her Xrays and CT scan showed possible tendonitis ? She is to f.u. with Orthopedist.  Completed Keflex course x2, final wound culture with no growth from 2/4. Finishing last doses today.  Final re-excision path from 1/29 with benign breast tissue and ADH. She saw Rad/Onc Dr. Alvarado pre-op, she is scheduled to see him again 2/21. She is scheduled to see Med/Onc Dr. Harkins on 3/6.   Breast calcs were initially found on CT to the left breast but further imaging found them to be benign, however indeterminate calcs were found on the Right on breast imaging.  10/14/2024 SB BINU bilat dMMG and US: Scattered areas of FG density. LEFT- UO breast reveals 2 coarse, benign calcifications. No susp calcs, architectural distortion or mass is seen. RIGHT*-Of incidental note, at the 12:00 right mid breast is focal architectural distortion w/associated heterogenous grouped microcalcs, persistent on additional targeted mmg imaging. This incidental finding is susp for malignancy, with no definitive US correlate. US: LEFT: targeted imaging of the UO breast reveals coarse calcs which correlates to mmg findings. At the UO breast, there is a superficial focus of generalized increased density within the associated 2 mm debris-filled cyst, likely of recent traumatic injury. Coarse calcs 1:00 2cm FN correlates to benign mmg findings. RIGHT*: targeted imaging of the upper right breast reveals multiple debris-filled cysts, 12:00 1cm FN. A vague focus of decreased attenuation w/focal shadowing at the 12:00 position, though sonographically nonspecific. No definitive mass or distortion is seen to correlate to mmg findings above. A minimally dilated duct is noted at the right breast 9:00 1cm FN, minimally dilated ducts or noted at the RA region. Morphologically normal-appearing axillary LN bilat. Imp- Benign calcs at the UO LEFT breast correlates to outside CT. Incidental note of focal architectural distortion w/associated grouped heterogeneous calcs at the 12:00 RIGH* mid breast on mmg, susp for malignancy, as this finding has no definitive sono correlate. Rec- stereotactic bx RIGHT. BR4  11/21/2024 [facility] stereotactic core bx Pathology: UO RIGHT- DCIS, intermediate gr, nuclear atypia. Microcals present. ER+ (95%)/ MT+ (100%). Results are CONDORDANT. Of note, residual calcs are seen at the bx site measuring up to 2.3cm in widest AP dimension. Wide excision should be considered at the time of surgery.  12/27/2024 NFR breast MRI: The breast parenchyma is composed of scattered FG tissue. RIGHT BREAST: The bx marker in the location of the recently diagnosed malignancy is associated with non-mass enhancement measuring 2 cm, axial image 36. There are scattered enhancing nonspecific foci. There is no additional suspicious enhancement in the right breast. LEFT BREAST: There are scattered enhancing nonspecific foci. There is no suspicious enhancement in the left breast. AXILLA/OTHER: There is no significant axillary or internal mammary lymphadenopathy. IMPRESSION: Recently diagnosed right breast malignancy. No additional suspicious enhancement. REC: Surgical or oncologic management. BR6.  1/16/2025 Surgical Pathology Report - Final Diagnosis: 1. R lumpectomy: DCIS, Gr 2, microcalcs present in DCIS and in nonneoplastic tissue. Closest margin: <1 mm, Anterior and Lateral Margin distance: 1.5 mm. 2. R, additional post margin, excision: Benign breast tissue with FCC. 3. R, additional medial margin, excision: DCIS, morphologically similar to that seen in part 1. DCIS measures 1.3mm in greatest diameter and is >2 mm from all inked margins including the new medial margin. 4. R additional inferior margin, excision: Benign breast tissue with FCC.  1/29/25 Re-excision path: R addt'l lateral margin: benign tissue w/sclerosisng adenosis, calcs, and bx changes. R addt'l anterior margin: ADH, single focus, partly involving sclerosing adenosis, bx changes.  Fhx: Breast-Niece ? age. 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

## 2025-02-13 NOTE — ASSESSMENT
[FreeTextEntry1] : Referred by Dr. Tran Perez PCP NATHANIEL Aguiar  Patient is a 79-year-old female here today for post op visit today for seroma and incision check. She is s/p 1/29/25 re-excision of anterior and lateral margin and 1/16/25 R wire loc lumpectomy for DCIS ER/ME+ 95% /100%. 1/29/25 Re-excision path: R addt'l lateral margin: benign tissue w/sclerosisng adenosis, calcs, and bx changes. R addt'l anterior margin: ADH, single focus, partly involving sclerosing adenosis, bx changes. 1/16/25 surgical path: R lumpectomy: DCIS, Gr 2, microcalcs present in DCIS and in nonneoplastic tissue. Closest margin: <1 mm, Anterior and Lateral Margin distance: 1.5 mm. 2. R, additional post margin, excision: Benign breast tissue with FCC. 3. R, additional medial margin, excision: DCIS, morphologically similar to that seen in part 1. DCIS measures 1.3mm in greatest diameter and is >2 mm from all inked margins including the new medial margin. 4. R additional inferior margin, excision: Benign breast tissue with FCC.  Pt presents today saying she feels much better. No further swelling ore redness noted to the R breast, no discomfort reported. She reports she went to the ER on Sunday because she started to have left shoulder/arm pain since Friday, she was told her Xrays and CT scan showed possible tendonitis ? She is to f.u. with Orthopedist.  Completed Keflex course x2, final wound culture with no growth from 2/4. Finishing last doses today.  Final re-excision path from 1/29 with benign breast tissue and ADH. She saw Rad/Onc Dr. Alvarado pre-op, she is scheduled to see him again 2/21. She is scheduled to see Med/Onc Dr. Harkins on 3/6.   Breast calcs were initially found on CT to the left breast but further imaging found them to be benign, however indeterminate calcs were found on the Right on breast imaging.  10/14/2024 SB BINU bilat dMMG and US: Scattered areas of FG density. LEFT- UO breast reveals 2 coarse, benign calcifications. No susp calcs, architectural distortion or mass is seen. RIGHT*-Of incidental note, at the 12:00 right mid breast is focal architectural distortion w/associated heterogenous grouped microcalcs, persistent on additional targeted mmg imaging. This incidental finding is susp for malignancy, with no definitive US correlate. US: LEFT: targeted imaging of the UO breast reveals coarse calcs which correlates to mmg findings. At the UO breast, there is a superficial focus of generalized increased density within the associated 2 mm debris-filled cyst, likely of recent traumatic injury. Coarse calcs 1:00 2cm FN correlates to benign mmg findings. RIGHT*: targeted imaging of the upper right breast reveals multiple debris-filled cysts, 12:00 1cm FN. A vague focus of decreased attenuation w/focal shadowing at the 12:00 position, though sonographically nonspecific. No definitive mass or distortion is seen to correlate to mmg findings above. A minimally dilated duct is noted at the right breast 9:00 1cm FN, minimally dilated ducts or noted at the RA region. Morphologically normal-appearing axillary LN bilat. Imp- Benign calcs at the UO LEFT breast correlates to outside CT. Incidental note of focal architectural distortion w/associated grouped heterogeneous calcs at the 12:00 RIGH* mid breast on mmg, susp for malignancy, as this finding has no definitive sono correlate. Rec- stereotactic bx RIGHT. BR4  11/21/2024 [facility] stereotactic core bx Pathology: UO RIGHT- DCIS, intermediate gr, nuclear atypia. Microcals present. ER+ (95%)/ ME+ (100%). Results are CONDORDANT. Of note, residual calcs are seen at the bx site measuring up to 2.3cm in widest AP dimension. Wide excision should be considered at the time of surgery.  12/27/2024 NFR breast MRI: The breast parenchyma is composed of scattered FG tissue. RIGHT BREAST: The bx marker in the location of the recently diagnosed malignancy is associated with non-mass enhancement measuring 2 cm, axial image 36. There are scattered enhancing nonspecific foci. There is no additional suspicious enhancement in the right breast. LEFT BREAST: There are scattered enhancing nonspecific foci. There is no suspicious enhancement in the left breast. AXILLA/OTHER: There is no significant axillary or internal mammary lymphadenopathy. IMPRESSION: Recently diagnosed right breast malignancy. No additional suspicious enhancement. REC: Surgical or oncologic management. BR6.  1/16/2025 Surgical Pathology Report - Final Diagnosis: 1. R lumpectomy: DCIS, Gr 2, microcalcs present in DCIS and in nonneoplastic tissue. Closest margin: <1 mm, Anterior and Lateral Margin distance: 1.5 mm. 2. R, additional post margin, excision: Benign breast tissue with FCC. 3. R, additional medial margin, excision: DCIS, morphologically similar to that seen in part 1. DCIS measures 1.3mm in greatest diameter and is >2 mm from all inked margins including the new medial margin. 4. R additional inferior margin, excision: Benign breast tissue with FCC.  1/29/25 Re-excision path: R addt'l lateral margin: benign tissue w/sclerosisng adenosis, calcs, and bx changes. R addt'l anterior margin: ADH, single focus, partly involving sclerosing adenosis, bx changes.  Fhx: Breast-Niece ? age. 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  CBE: No hematoma, incision intact, no evidence of cellulitis, seroma present and pt offered drainage to pt to which she declines since not bothersome.  Final re-excision path reviewed, benign tissue with sclerosing adenosis, ADH single focus. 2/21 appt with Rad/Onc Dr. Alvarado for initation of EBRT post-op. To see Med/Onc Dr. Harkins given ER/ME+ tumor for AI discussion after RT, Finish  PLAN: Continue Keflex course. F..u with Rad/Onc Dr. Alvarado 2/21 To see Med/Onc Dr. Harkins. 3/6 F/u 1 month, sooner as needed.

## 2025-03-03 NOTE — HISTORY OF PRESENT ILLNESS
[de-identified] : Referred by: Dr. Kelly Mario PCP: Diagnosis:   *** is a pre/post-menopausal female who presented at age *** in March 2025 for evaluation of ***. The patient has a medical history of ***   RT?  Arimdex   Imaging reviewed: MR breast 12/27/24- biopsy marker in the location of the recently diagnosed malignancy a/w NME measuring 2cm, BIRADS 6 Diagnostic MMG 10/14/24- RIGHT breast focal architectural distortion w/ heterogenous grouped calcifications, no US correlate BIRADS 4  Pathology reviewed: Right breast biopsy 11/21/24- DCIS, intermediate grade, ER+ 95%, VA+ 100% Right breast lateral margin 1/29/25- benign breast tissue Right lumpectomy 1/16/25- DCIS, measuring 15mm, grade 2, also present at the medial margin, <1mm from anterior margin [id number] Anterior margin 1/29/25- ADH, single focus  Genetics:   HCM: - Colonoscopy: - Gyn/pap: - Breast imaging: - Lung cancer screen: - Bone density: DEXA ***   SH: - Occupation: - Living situation: - Smoking/etoh/illicits: - Exercise:   OB/GYN Hx: - Age of menarche: - Age of menopause: - Pregnancy history: - Age at live birth: - OCP use: - Fertility treatments: - Hormonal therapy: - Breast feeding history:   FH: - -

## 2025-03-18 NOTE — HISTORY OF PRESENT ILLNESS
[FreeTextEntry1] : Referred by Dr. Tran Perez PCP NATHANIEL Aguiar  Patient is a 79-year-old female here today for post op visit today. She is s/p 1/29/25 re-excision of anterior and lateral margin and 1/16/25 R wire loc lumpectomy for DCIS ER/MS+ 95% /100%. 1/29/25 Re-excision path: R lateral margin: benign tissue w/sclerosisng adenosis, calcs, and bx changes. R anterior margin: ADH, single focus, partly involving sclerosing adenosis, bx changes. 1/16/25 surgical path: R lumpectomy: DCIS, Gr 2, microcalcs present in DCIS and in nonneoplastic tissue. Closest margin: <1 mm, Anterior and Lateral Margin distance: 1.5 mm. 2. R, additional post margin, excision: Benign breast tissue with FCC. 3. R, additional medial margin, excision: DCIS, morphologically similar to that seen in part 1. DCIS measures 1.3mm in greatest diameter and is >2 mm from all inked margins including the new medial margin. 4. R additional inferior margin, excision: Benign breast tissue with FCC.  Since last visit, pt was admitted to [facility] with Pneumonia and sepsis 2/22, she was then transferred to [place]. Last visit with us on 2/13, pt was asymptomatic.  Previously she went to ER on 2/9 for c/o left shoulder/arm pain, she was told her Xrays and CT scan showed possible tendonitis and was to f.u. with Ortho outpatient.  Post-op she completed Keflex course x2, final wound culture with no growth from 2/4.   Final re-excision path from 1/29 with benign breast tissue and ADH. She saw Rad/Onc Dr. Alvarado pre-op, she is scheduled to see him again 2/21. She is scheduled to see Med/Onc Dr. Harkins on 3/6.  Breast calcs were initially found on CT to the left breast but further imaging found them to be benign, however indeterminate calcs were found on the Right on breast imaging.  10/14/2024 SB BINU bilat dMMG and US: Scattered areas of FG density. LEFT- UO breast reveals 2 coarse, benign calcifications. No susp calcs, architectural distortion or mass is seen. RIGHT*-Of incidental note, at the 12:00 right mid breast is focal architectural distortion w/associated heterogenous grouped microcalcs, persistent on additional targeted mmg imaging. This incidental finding is susp for malignancy, with no definitive US correlate. US: LEFT: targeted imaging of the UO breast reveals coarse calcs which correlates to mmg findings. At the UO breast, there is a superficial focus of generalized increased density within the associated 2 mm debris-filled cyst, likely of recent traumatic injury. Coarse calcs 1:00 2cm FN correlates to benign mmg findings. RIGHT*: targeted imaging of the upper right breast reveals multiple debris-filled cysts, 12:00 1cm FN. A vague focus of decreased attenuation w/focal shadowing at the 12:00 position, though sonographically nonspecific. No definitive mass or distortion is seen to correlate to mmg findings above. A minimally dilated duct is noted at the right breast 9:00 1cm FN, minimally dilated ducts or noted at the RA region. Morphologically normal-appearing axillary LN bilat. Imp- Benign calcs at the UO LEFT breast correlates to outside CT. Incidental note of focal architectural distortion w/associated grouped heterogeneous calcs at the 12:00 RIGH* mid breast on mmg, susp for malignancy, as this finding has no definitive sono correlate. Rec- stereotactic bx RIGHT. BR4  11/21/2024 [facility] stereotactic core bx Pathology: UO RIGHT- DCIS, intermediate gr, nuclear atypia. Microcals present. ER+ (95%)/ MS+ (100%). Results are CONDORDANT. Of note, residual calcs are seen at the bx site measuring up to 2.3cm in widest AP dimension. Wide excision should be considered at the time of surgery.  12/27/2024 NFR breast MRI: The breast parenchyma is composed of scattered FG tissue. RIGHT BREAST: The bx marker in the location of the recently diagnosed malignancy is associated with non-mass enhancement measuring 2 cm, axial image 36. There are scattered enhancing nonspecific foci. There is no additional suspicious enhancement in the right breast. LEFT BREAST: There are scattered enhancing nonspecific foci. There is no suspicious enhancement in the left breast. AXILLA/OTHER: There is no significant axillary or internal mammary lymphadenopathy. IMPRESSION: Recently diagnosed right breast malignancy. No additional suspicious enhancement. REC: Surgical or oncologic management. BR6.  1/16/2025 Surgical Pathology Report - Final Diagnosis: 1. R lumpectomy: DCIS, Gr 2, microcalcs present in DCIS and in nonneoplastic tissue. Closest margin: <1 mm, Anterior and Lateral Margin distance: 1.5 mm. 2. R, addt'l post margin, excision: Benign breast tissue with FCC. 3. R, addit'l medial margin, excision: DCIS, morphologically similar to that seen in part 1. DCIS measures 1.3mm in greatest diameter and is >2 mm from all inked margins including the new medial margin. 4. R additional inferior margin, excision: Benign breast tissue with FCC.  1/29/25 Re-excision path: R addt'l lateral margin: benign tissue w/sclerosisng adenosis, calcs, and bx changes. R addt'l anterior margin: ADH, single focus, partly involving sclerosing adenosis, bx changes.  Fhx: Breast-Niece ? age. 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  CBE: No hematoma, incision intact, no evidence of cellulitis, seroma present and pt offered drainage to pt to which she declines since not bothersome.  Final re-excision path reviewed, benign tissue with sclerosing adenosis, ADH single focus. 2/21 appt with Rad/Onc Dr. Alvarado for initation of EBRT post-op. To see Med/Onc Dr. Harkins given ER/MS+ tumor for AI discussion after RT, Finish  PLAN: Continue Keflex course. F..u with Rad/Onc Dr. Alvarado 2/21 To see Med/Onc Dr. Harkins. 3/6 F/u 1 month, sooner as needed.